# Patient Record
Sex: MALE | Race: BLACK OR AFRICAN AMERICAN | Employment: FULL TIME | URBAN - METROPOLITAN AREA
[De-identification: names, ages, dates, MRNs, and addresses within clinical notes are randomized per-mention and may not be internally consistent; named-entity substitution may affect disease eponyms.]

---

## 2017-01-26 LAB — CRC RECOMMENDATION EXT: NORMAL

## 2020-11-01 ENCOUNTER — APPOINTMENT (OUTPATIENT)
Dept: CT IMAGING | Age: 55
DRG: 420 | End: 2020-11-01
Payer: MEDICAID

## 2020-11-01 ENCOUNTER — APPOINTMENT (OUTPATIENT)
Dept: GENERAL RADIOLOGY | Age: 55
DRG: 420 | End: 2020-11-01
Payer: MEDICAID

## 2020-11-01 ENCOUNTER — HOSPITAL ENCOUNTER (INPATIENT)
Age: 55
LOS: 8 days | Discharge: HOME | DRG: 420 | End: 2020-11-09
Attending: EMERGENCY MEDICINE | Admitting: INTERNAL MEDICINE
Payer: MEDICAID

## 2020-11-01 PROBLEM — E11.10 DKA, TYPE 2, NOT AT GOAL (HCC): Status: ACTIVE | Noted: 2020-11-01

## 2020-11-01 LAB
ALBUMIN SERPL-MCNC: 4.6 G/DL (ref 3.5–5.2)
ALP BLD-CCNC: 109 U/L (ref 40–129)
ALT SERPL-CCNC: 61 U/L (ref 0–40)
ANION GAP SERPL CALCULATED.3IONS-SCNC: 27 MMOL/L (ref 7–16)
ANION GAP SERPL CALCULATED.3IONS-SCNC: 33 MMOL/L (ref 7–16)
AST SERPL-CCNC: 20 U/L (ref 0–39)
B.E.: -8 MMOL/L (ref -3–3)
BACTERIA: ABNORMAL /HPF
BASOPHILS ABSOLUTE: 0.01 E9/L (ref 0–0.2)
BASOPHILS RELATIVE PERCENT: 0.1 % (ref 0–2)
BETA-HYDROXYBUTYRATE: >4.5 MMOL/L (ref 0.02–0.27)
BILIRUB SERPL-MCNC: 1.1 MG/DL (ref 0–1.2)
BILIRUBIN URINE: NEGATIVE
BLOOD, URINE: ABNORMAL
BUN BLDV-MCNC: 46 MG/DL (ref 6–20)
BUN BLDV-MCNC: 51 MG/DL (ref 6–20)
CALCIUM SERPL-MCNC: 10.6 MG/DL (ref 8.6–10.2)
CALCIUM SERPL-MCNC: 9.9 MG/DL (ref 8.6–10.2)
CHLORIDE BLD-SCNC: 109 MMOL/L (ref 98–107)
CHLORIDE BLD-SCNC: 121 MMOL/L (ref 98–107)
CHLORIDE URINE RANDOM: 23 MMOL/L
CHP ED QC CHECK: YES
CLARITY: CLEAR
CO2: 13 MMOL/L (ref 22–29)
CO2: 15 MMOL/L (ref 22–29)
COHB: 0.4 % (ref 0–1.5)
COLOR: YELLOW
CREAT SERPL-MCNC: 1.6 MG/DL (ref 0.7–1.2)
CREAT SERPL-MCNC: 1.8 MG/DL (ref 0.7–1.2)
CREATININE URINE: 204 MG/DL (ref 40–278)
CRITICAL: ABNORMAL
DATE ANALYZED: ABNORMAL
DATE OF COLLECTION: ABNORMAL
EOSINOPHILS ABSOLUTE: 0 E9/L (ref 0.05–0.5)
EOSINOPHILS RELATIVE PERCENT: 0 % (ref 0–6)
GFR AFRICAN AMERICAN: 29
GFR AFRICAN AMERICAN: 29
GFR AFRICAN AMERICAN: 36
GFR AFRICAN AMERICAN: 38
GFR AFRICAN AMERICAN: 45
GFR AFRICAN AMERICAN: 47
GFR AFRICAN AMERICAN: 54
GFR NON-AFRICAN AMERICAN: 24 ML/MIN/1.73
GFR NON-AFRICAN AMERICAN: 24 ML/MIN/1.73
GFR NON-AFRICAN AMERICAN: 30 ML/MIN/1.73
GFR NON-AFRICAN AMERICAN: 31 ML/MIN/1.73
GFR NON-AFRICAN AMERICAN: 37 ML/MIN/1.73
GFR NON-AFRICAN AMERICAN: 47 ML/MIN/1.73
GFR NON-AFRICAN AMERICAN: 54 ML/MIN/1.73
GLUCOSE BLD-MCNC: 519 MG/DL (ref 74–99)
GLUCOSE BLD-MCNC: 533 MG/DL (ref 74–99)
GLUCOSE BLD-MCNC: 592 MG/DL (ref 74–99)
GLUCOSE BLD-MCNC: 659 MG/DL (ref 74–99)
GLUCOSE BLD-MCNC: 766 MG/DL (ref 74–99)
GLUCOSE BLD-MCNC: >700 MG/DL (ref 74–99)
GLUCOSE BLD-MCNC: >700 MG/DL (ref 74–99)
GLUCOSE URINE: >=1000 MG/DL
HBA1C MFR BLD: 11.2 % (ref 4–5.6)
HCO3: 17.6 MMOL/L (ref 22–26)
HCT VFR BLD CALC: 53.8 % (ref 37–54)
HEMOGLOBIN: 16.9 G/DL (ref 12.5–16.5)
HHB: 5.7 % (ref 0–5)
IMMATURE GRANULOCYTES #: 0.03 E9/L
IMMATURE GRANULOCYTES %: 0.3 % (ref 0–5)
KETONES, URINE: >=80 MG/DL
LAB: ABNORMAL
LEUKOCYTE ESTERASE, URINE: NEGATIVE
LYMPHOCYTES ABSOLUTE: 1.09 E9/L (ref 1.5–4)
LYMPHOCYTES RELATIVE PERCENT: 10.8 % (ref 20–42)
Lab: ABNORMAL
MAGNESIUM: 3.7 MG/DL (ref 1.6–2.6)
MAGNESIUM: 3.7 MG/DL (ref 1.6–2.6)
MCH RBC QN AUTO: 28.6 PG (ref 26–35)
MCHC RBC AUTO-ENTMCNC: 31.4 % (ref 32–34.5)
MCV RBC AUTO: 91.2 FL (ref 80–99.9)
METER GLUCOSE: 307 MG/DL (ref 74–99)
METER GLUCOSE: 321 MG/DL (ref 74–99)
METER GLUCOSE: 344 MG/DL (ref 74–99)
METER GLUCOSE: 422 MG/DL (ref 74–99)
METER GLUCOSE: 423 MG/DL (ref 74–99)
METER GLUCOSE: 482 MG/DL (ref 74–99)
METER GLUCOSE: >500 MG/DL (ref 74–99)
METHB: 0.3 % (ref 0–1.5)
MODE: ABNORMAL
MONOCYTES ABSOLUTE: 0.65 E9/L (ref 0.1–0.95)
MONOCYTES RELATIVE PERCENT: 6.4 % (ref 2–12)
NEUTROPHILS ABSOLUTE: 8.31 E9/L (ref 1.8–7.3)
NEUTROPHILS RELATIVE PERCENT: 82.4 % (ref 43–80)
NITRITE, URINE: NEGATIVE
O2 CONTENT: 23 ML/DL
O2 SATURATION: 94.3 % (ref 92–98.5)
O2HB: 93.6 % (ref 94–97)
OPERATOR ID: 1023
PATIENT TEMP: 37 C
PCO2: 36.7 MMHG (ref 35–45)
PDW BLD-RTO: 13.7 FL (ref 11.5–15)
PERFORMED ON: ABNORMAL
PH BLOOD GAS: 7.3 (ref 7.35–7.45)
PH UA: 5.5 (ref 5–9)
PHOSPHORUS: 1.7 MG/DL (ref 2.5–4.5)
PLATELET # BLD: 267 E9/L (ref 130–450)
PMV BLD AUTO: 11.1 FL (ref 7–12)
PO2: 78 MMHG (ref 75–100)
POC CHLORIDE: 125 MMOL/L (ref 100–108)
POC CHLORIDE: 126 MMOL/L (ref 100–108)
POC CHLORIDE: 128 MMOL/L (ref 100–108)
POC CHLORIDE: 136 MMOL/L (ref 100–108)
POC CHLORIDE: 136 MMOL/L (ref 100–108)
POC CREATININE: 1.9 MG/DL (ref 0.7–1.2)
POC CREATININE: 2.2 MG/DL (ref 0.7–1.2)
POC CREATININE: 2.3 MG/DL (ref 0.7–1.2)
POC CREATININE: 2.8 MG/DL (ref 0.7–1.2)
POC CREATININE: 2.8 MG/DL (ref 0.7–1.2)
POC POTASSIUM: 4.6 MMOL/L (ref 3.5–5)
POC POTASSIUM: >9 MMOL/L (ref 3.5–5)
POC SODIUM: 132 MMOL/L (ref 132–146)
POC SODIUM: 133 MMOL/L (ref 132–146)
POC SODIUM: 145 MMOL/L (ref 132–146)
POC SODIUM: 145 MMOL/L (ref 132–146)
POC SODIUM: 161 MMOL/L (ref 132–146)
POTASSIUM SERPL-SCNC: 4.8 MMOL/L (ref 3.5–5)
POTASSIUM SERPL-SCNC: 5.3 MMOL/L (ref 3.5–5)
POTASSIUM, UR: 65.2 MMOL/L
PROCALCITONIN: 0.27 NG/ML (ref 0–0.08)
PROTEIN UA: NEGATIVE MG/DL
RBC # BLD: 5.9 E12/L (ref 3.8–5.8)
RBC UA: ABNORMAL /HPF (ref 0–2)
REASON FOR REJECTION: NORMAL
REJECTED TEST: NORMAL
SARS-COV-2, NAAT: NOT DETECTED
SODIUM BLD-SCNC: 157 MMOL/L (ref 132–146)
SODIUM BLD-SCNC: 161 MMOL/L (ref 132–146)
SODIUM URINE: <20 MMOL/L
SOURCE, BLOOD GAS: ABNORMAL
SPECIFIC GRAVITY UA: 1.02 (ref 1–1.03)
THB: 17.5 G/DL (ref 11.5–16.5)
TIME ANALYZED: 1247
TOTAL PROTEIN: 8.8 G/DL (ref 6.4–8.3)
TROPONIN: <0.01 NG/ML (ref 0–0.03)
TROPONIN: <0.01 NG/ML (ref 0–0.03)
UREA NITROGEN, UR: 1363 MG/DL (ref 800–1666)
UROBILINOGEN, URINE: 0.2 E.U./DL
WBC # BLD: 10.1 E9/L (ref 4.5–11.5)
WBC UA: ABNORMAL /HPF (ref 0–5)

## 2020-11-01 PROCEDURE — 82570 ASSAY OF URINE CREATININE: CPT

## 2020-11-01 PROCEDURE — 82805 BLOOD GASES W/O2 SATURATION: CPT

## 2020-11-01 PROCEDURE — 84295 ASSAY OF SERUM SODIUM: CPT

## 2020-11-01 PROCEDURE — 83735 ASSAY OF MAGNESIUM: CPT

## 2020-11-01 PROCEDURE — 70450 CT HEAD/BRAIN W/O DYE: CPT

## 2020-11-01 PROCEDURE — 93005 ELECTROCARDIOGRAM TRACING: CPT | Performed by: NURSE PRACTITIONER

## 2020-11-01 PROCEDURE — 82565 ASSAY OF CREATININE: CPT

## 2020-11-01 PROCEDURE — 84484 ASSAY OF TROPONIN QUANT: CPT

## 2020-11-01 PROCEDURE — 82962 GLUCOSE BLOOD TEST: CPT

## 2020-11-01 PROCEDURE — 2580000003 HC RX 258: Performed by: NURSE PRACTITIONER

## 2020-11-01 PROCEDURE — 80048 BASIC METABOLIC PNL TOTAL CA: CPT

## 2020-11-01 PROCEDURE — 99223 1ST HOSP IP/OBS HIGH 75: CPT | Performed by: INTERNAL MEDICINE

## 2020-11-01 PROCEDURE — 87450 HC DIRECT STREP B ANTIGEN: CPT

## 2020-11-01 PROCEDURE — 2580000003 HC RX 258: Performed by: EMERGENCY MEDICINE

## 2020-11-01 PROCEDURE — 87081 CULTURE SCREEN ONLY: CPT

## 2020-11-01 PROCEDURE — 84145 PROCALCITONIN (PCT): CPT

## 2020-11-01 PROCEDURE — 84100 ASSAY OF PHOSPHORUS: CPT

## 2020-11-01 PROCEDURE — 2580000003 HC RX 258: Performed by: INTERNAL MEDICINE

## 2020-11-01 PROCEDURE — 6370000000 HC RX 637 (ALT 250 FOR IP): Performed by: EMERGENCY MEDICINE

## 2020-11-01 PROCEDURE — 84300 ASSAY OF URINE SODIUM: CPT

## 2020-11-01 PROCEDURE — 83935 ASSAY OF URINE OSMOLALITY: CPT

## 2020-11-01 PROCEDURE — 99285 EMERGENCY DEPT VISIT HI MDM: CPT

## 2020-11-01 PROCEDURE — 87088 URINE BACTERIA CULTURE: CPT

## 2020-11-01 PROCEDURE — 84133 ASSAY OF URINE POTASSIUM: CPT

## 2020-11-01 PROCEDURE — 6360000002 HC RX W HCPCS: Performed by: EMERGENCY MEDICINE

## 2020-11-01 PROCEDURE — 82947 ASSAY GLUCOSE BLOOD QUANT: CPT

## 2020-11-01 PROCEDURE — 71045 X-RAY EXAM CHEST 1 VIEW: CPT

## 2020-11-01 PROCEDURE — 82010 KETONE BODYS QUAN: CPT

## 2020-11-01 PROCEDURE — 84132 ASSAY OF SERUM POTASSIUM: CPT

## 2020-11-01 PROCEDURE — 2500000003 HC RX 250 WO HCPCS: Performed by: INTERNAL MEDICINE

## 2020-11-01 PROCEDURE — 87449 NOS EACH ORGANISM AG IA: CPT

## 2020-11-01 PROCEDURE — 96374 THER/PROPH/DIAG INJ IV PUSH: CPT

## 2020-11-01 PROCEDURE — 85025 COMPLETE CBC W/AUTO DIFF WBC: CPT

## 2020-11-01 PROCEDURE — 6360000002 HC RX W HCPCS: Performed by: INTERNAL MEDICINE

## 2020-11-01 PROCEDURE — 80053 COMPREHEN METABOLIC PANEL: CPT

## 2020-11-01 PROCEDURE — 82436 ASSAY OF URINE CHLORIDE: CPT

## 2020-11-01 PROCEDURE — 82435 ASSAY OF BLOOD CHLORIDE: CPT

## 2020-11-01 PROCEDURE — 81001 URINALYSIS AUTO W/SCOPE: CPT

## 2020-11-01 PROCEDURE — 2000000000 HC ICU R&B

## 2020-11-01 PROCEDURE — 86140 C-REACTIVE PROTEIN: CPT

## 2020-11-01 PROCEDURE — U0002 COVID-19 LAB TEST NON-CDC: HCPCS

## 2020-11-01 PROCEDURE — 83036 HEMOGLOBIN GLYCOSYLATED A1C: CPT

## 2020-11-01 PROCEDURE — 93005 ELECTROCARDIOGRAM TRACING: CPT | Performed by: INTERNAL MEDICINE

## 2020-11-01 PROCEDURE — 84540 ASSAY OF URINE/UREA-N: CPT

## 2020-11-01 RX ORDER — POTASSIUM CHLORIDE 7.45 MG/ML
10 INJECTION INTRAVENOUS PRN
Status: DISCONTINUED | OUTPATIENT
Start: 2020-11-01 | End: 2020-11-02

## 2020-11-01 RX ORDER — ONDANSETRON 2 MG/ML
4 INJECTION INTRAMUSCULAR; INTRAVENOUS EVERY 6 HOURS PRN
Status: DISCONTINUED | OUTPATIENT
Start: 2020-11-01 | End: 2020-11-09 | Stop reason: HOSPADM

## 2020-11-01 RX ORDER — SODIUM CHLORIDE 0.9 % (FLUSH) 0.9 %
10 SYRINGE (ML) INJECTION PRN
Status: DISCONTINUED | OUTPATIENT
Start: 2020-11-01 | End: 2020-11-09 | Stop reason: HOSPADM

## 2020-11-01 RX ORDER — ACETAMINOPHEN 325 MG/1
650 TABLET ORAL EVERY 6 HOURS PRN
Status: DISCONTINUED | OUTPATIENT
Start: 2020-11-01 | End: 2020-11-09 | Stop reason: HOSPADM

## 2020-11-01 RX ORDER — MAGNESIUM SULFATE 1 G/100ML
1 INJECTION INTRAVENOUS PRN
Status: DISCONTINUED | OUTPATIENT
Start: 2020-11-01 | End: 2020-11-02

## 2020-11-01 RX ORDER — 0.9 % SODIUM CHLORIDE 0.9 %
1000 INTRAVENOUS SOLUTION INTRAVENOUS ONCE
Status: COMPLETED | OUTPATIENT
Start: 2020-11-01 | End: 2020-11-01

## 2020-11-01 RX ORDER — SODIUM CHLORIDE 450 MG/100ML
INJECTION, SOLUTION INTRAVENOUS CONTINUOUS
Status: DISCONTINUED | OUTPATIENT
Start: 2020-11-01 | End: 2020-11-01

## 2020-11-01 RX ORDER — PROMETHAZINE HYDROCHLORIDE 25 MG/1
12.5 TABLET ORAL EVERY 6 HOURS PRN
Status: DISCONTINUED | OUTPATIENT
Start: 2020-11-01 | End: 2020-11-09 | Stop reason: HOSPADM

## 2020-11-01 RX ORDER — ASPIRIN 81 MG/1
81 TABLET ORAL DAILY
COMMUNITY

## 2020-11-01 RX ORDER — ACETAMINOPHEN 650 MG/1
650 SUPPOSITORY RECTAL EVERY 6 HOURS PRN
Status: DISCONTINUED | OUTPATIENT
Start: 2020-11-01 | End: 2020-11-09 | Stop reason: HOSPADM

## 2020-11-01 RX ORDER — DEXTROSE, SODIUM CHLORIDE, AND POTASSIUM CHLORIDE 5; .45; .15 G/100ML; G/100ML; G/100ML
INJECTION INTRAVENOUS CONTINUOUS PRN
Status: DISCONTINUED | OUTPATIENT
Start: 2020-11-01 | End: 2020-11-02

## 2020-11-01 RX ORDER — SODIUM CHLORIDE 450 MG/100ML
INJECTION, SOLUTION INTRAVENOUS CONTINUOUS
Status: DISCONTINUED | OUTPATIENT
Start: 2020-11-01 | End: 2020-11-02

## 2020-11-01 RX ORDER — DEXTROSE MONOHYDRATE 25 G/50ML
12.5 INJECTION, SOLUTION INTRAVENOUS PRN
Status: DISCONTINUED | OUTPATIENT
Start: 2020-11-01 | End: 2020-11-09 | Stop reason: HOSPADM

## 2020-11-01 RX ORDER — 0.9 % SODIUM CHLORIDE 0.9 %
2000 INTRAVENOUS SOLUTION INTRAVENOUS ONCE
Status: COMPLETED | OUTPATIENT
Start: 2020-11-01 | End: 2020-11-01

## 2020-11-01 RX ORDER — SODIUM CHLORIDE 0.9 % (FLUSH) 0.9 %
10 SYRINGE (ML) INJECTION EVERY 12 HOURS SCHEDULED
Status: DISCONTINUED | OUTPATIENT
Start: 2020-11-01 | End: 2020-11-09 | Stop reason: HOSPADM

## 2020-11-01 RX ORDER — HEPARIN SODIUM 10000 [USP'U]/ML
5000 INJECTION, SOLUTION INTRAVENOUS; SUBCUTANEOUS EVERY 8 HOURS SCHEDULED
Status: DISCONTINUED | OUTPATIENT
Start: 2020-11-02 | End: 2020-11-02

## 2020-11-01 RX ADMIN — SODIUM CHLORIDE 1000 ML: 9 INJECTION, SOLUTION INTRAVENOUS at 13:30

## 2020-11-01 RX ADMIN — SODIUM PHOSPHATE, MONOBASIC, MONOHYDRATE AND SODIUM PHOSPHATE, DIBASIC, ANHYDROUS 15 MMOL: 276; 142 INJECTION, SOLUTION INTRAVENOUS at 23:07

## 2020-11-01 RX ADMIN — SODIUM CHLORIDE 1000 ML: 9 INJECTION, SOLUTION INTRAVENOUS at 13:04

## 2020-11-01 RX ADMIN — SODIUM CHLORIDE 0.1 UNITS/KG/HR: 9 INJECTION, SOLUTION INTRAVENOUS at 15:59

## 2020-11-01 RX ADMIN — SODIUM CHLORIDE: 4.5 INJECTION, SOLUTION INTRAVENOUS at 19:42

## 2020-11-01 RX ADMIN — POTASSIUM CHLORIDE: 2 INJECTION, SOLUTION, CONCENTRATE INTRAVENOUS at 15:27

## 2020-11-01 RX ADMIN — ENOXAPARIN SODIUM 40 MG: 40 INJECTION SUBCUTANEOUS at 22:17

## 2020-11-01 ASSESSMENT — PAIN SCALES - GENERAL: PAINLEVEL_OUTOF10: 0

## 2020-11-01 NOTE — ED NOTES
POC ran for glucose. K+ hemolyzed, provider notified of hemolysis.       Susan Bean RN  11/01/20 1642

## 2020-11-01 NOTE — ED PROVIDER NOTES
ED Attending  CC: Linda         Department of Emergency Medicine   ED  Provider Note  Admit Date/RoomTime: 11/1/2020 11:23 AM  ED Room: 09/09  MRN: [id number]  Chief Complaint: Fatigue (with sore throat and dizziness x2 days) and Shortness of Breath (starting today)       History of Present Illness   Source of history provided by:  patient. History/Exam Limitations: none. Cait Barajas is a 53 y.o. male who presents to the ED by ambulance and is alone for fatigue and shortness of breath, beginning over a month ago and are unchanged since that time. He states this is associated with blurred vision, feeling dehydrated, increased thirst, frequent urination and denies any associated traumatic incident, frequent headaches, syncope, aphasia, chest pain, fever, cough, nausea, vomiting, abdominal pain, diarrhea, dysuria, hematuria, calf pain or leg swelling. He does admit to seeing a family physician about 2 years ago in [facility] and denies any known medical history. He did have a stress test about 2 years ago and reports this to be negative and denies ever having a heart catheterization. There is a family history of diabetes in his mother and is unsure if it is type I or type II. Otherwise he takes no daily medications. EMS  mg/dL. The complaint has been persistent, moderate in severity. ROS    Pertinent positives and negatives are stated within HPI, all other systems reviewed and are negative. History reviewed. No pertinent past medical history. History reviewed. No pertinent surgical history. Social History:  reports that he has never smoked. He has never used smokeless tobacco. He reports current alcohol use. He reports that he does not use drugs. Family History: family history is not on file. Allergies: Patient has no known allergies.     Physical Exam   Oxygen Saturation Interpretation: Normal.   ED Triage Vitals [11/01/20 1127]   BP Temp Temp Source Pulse Resp SpO2 Height Weight   (!) 177/114 96.2 (L) 20.0 - 42.0 %    Monocytes % 6.4 2.0 - 12.0 %    Eosinophils % 0.0 0.0 - 6.0 %    Basophils % 0.1 0.0 - 2.0 %    Neutrophils Absolute 8.31 (H) 1.80 - 7.30 E9/L    Immature Granulocytes # 0.03 E9/L    Lymphocytes Absolute 1.09 (L) 1.50 - 4.00 E9/L    Monocytes Absolute 0.65 0.10 - 0.95 E9/L    Eosinophils Absolute 0.00 (L) 0.05 - 0.50 E9/L    Basophils Absolute 0.01 0.00 - 0.20 E9/L   Troponin   Result Value Ref Range    Troponin <0.01 0.00 - 0.03 ng/mL   HEMOGLOBIN A1C   Result Value Ref Range    Hemoglobin A1C 11.2 (H) 4.0 - 5.6 %   Urinalysis   Result Value Ref Range    Color, UA Yellow Straw/Yellow    Clarity, UA Clear Clear    Glucose, Ur >=1000 (A) Negative mg/dL    Bilirubin Urine Negative Negative    Ketones, Urine >=80 (A) Negative mg/dL    Specific Gravity, UA 1.025 1.005 - 1.030    Blood, Urine SMALL (A) Negative    pH, UA 5.5 5.0 - 9.0    Protein, UA Negative Negative mg/dL    Urobilinogen, Urine 0.2 <2.0 E.U./dL    Nitrite, Urine Negative Negative    Leukocyte Esterase, Urine Negative Negative   Magnesium   Result Value Ref Range    Magnesium 3.7 (H) 1.6 - 2.6 mg/dL   Beta-Hydroxybutyrate   Result Value Ref Range    Beta-Hydroxybutyrate >4.50 (H) 0.02 - 0.27 mmol/L   COMPREHENSIVE METABOLIC PANEL   Result Value Ref Range    Sodium 157 (H) 132 - 146 mmol/L    Potassium 4.8 3.5 - 5.0 mmol/L    Chloride 109 (H) 98 - 107 mmol/L    CO2 15 (L) 22 - 29 mmol/L    Anion Gap 33 (H) 7 - 16 mmol/L    Glucose 766 (HH) 74 - 99 mg/dL    BUN 51 (H) 6 - 20 mg/dL    CREATININE 1.8 (H) 0.7 - 1.2 mg/dL    GFR Non-African American 47 >=60 mL/min/1.73    GFR African American 47     Calcium 10.6 (H) 8.6 - 10.2 mg/dL    Total Protein 8.8 (H) 6.4 - 8.3 g/dL    Alb 4.6 3.5 - 5.2 g/dL    Total Bilirubin 1.1 0.0 - 1.2 mg/dL    Alkaline Phosphatase 109 40 - 129 U/L    ALT 61 (H) 0 - 40 U/L    AST 20 0 - 39 U/L   Blood Gas, Arterial   Result Value Ref Range    Date Analyzed [id number]     Time Analyzed 1247     Source: Blood CONTRAST   Final Result   No acute intracranial abnormality. XR CHEST PORTABLE   Final Result   1. Slightly limited exam, grossly negative for acute process. .             ED Course / Medical Decision Making     Medications   potassium chloride 40 mEq in sodium chloride 0.45 % 1,000 mL infusion ( Intravenous New Bag 11/1/20 1527)   dextrose 50 % IV solution (has no administration in time range)   potassium chloride 10 mEq/100 mL IVPB (Peripheral Line) (has no administration in time range)   magnesium sulfate 1 g in dextrose 5% 100 mL IVPB (has no administration in time range)   sodium phosphate 10 mmol in dextrose 5 % 250 mL IVPB (has no administration in time range)     Or   sodium phosphate 15 mmol in dextrose 5 % 250 mL IVPB (has no administration in time range)     Or   sodium phosphate 20 mmol in dextrose 5 % 500 mL IVPB (has no administration in time range)   dextrose 5 % and 0.45 % NaCl with KCl 20 mEq infusion (has no administration in time range)   insulin regular (HUMULIN R;NOVOLIN R) 100 Units in sodium chloride 0.9 % 100 mL infusion (0.1 Units/kg/hr × 132.9 kg Intravenous New Bag 11/1/20 1559)   0.9 % sodium chloride bolus (0 mLs Intravenous Stopped 11/1/20 1530)   0.9 % sodium chloride bolus (0 mLs Intravenous Stopped 11/1/20 1430)     ED Course as of Nov 01 1632   Sun Nov 01, 2020   1420 Patient updated regarding the results of his evaluation. He will be admitted to the hospital, will go to the ICU, will consult nephrology. [SO]   [id number] Spoke with Dr. Arcola Galeazzi, nephrology. He recommends hydration with half-normal saline considering the hypernatremia and dehydration in this patient who is in DKA.    [SO]   [phone] with Dr. Chaya Villa, intensivist, will accept this patient to the ICU.    [SO]      ED Course User Index  [SO] Dotty Malloy DO    EKG #1:  Interpreted by emergency department physician unless otherwise noted. Time:  1134    Rate: 106  Rhythm: Sinus.   Interpretation: sinus tachycardia, T wave inversion lateral leads, no previous for comparison. Re-examination:  11/1/20       Time: 1350   Patient off the unit in radiology. Time: 1440   Patient moved to Main ED and updated on results and admission to the hospital. No change in exam.    Consult(s):   IP CONSULT TO NEPHROLOGY  IP CONSULT TO INTERNAL MEDICINE  IP CONSULT TO CRITICAL CARE   Time: 12 Dr. Ehsan Christiansen returned call and updated by Dr. Juana Machuca. Time: 9784 Dr. Linda Londono returned call and accepts the patient. Time: [address] resident aware of consult by me. Time: 5 Dr. Chavez Gonzalez returned call and accepts the patient to ICU. Procedure(s):   none    MDM:   Patient evaluated by Dr. Juana Machuca. He has no history of diabetes however high suspicion for DKA given his clinical exam and glucometer reading of \"hi\" despite EMS reports of  mg/dL. Labs and diagnostics as resulted above including BGL 766mg/dL and POC chemistry hemolyzed. No peaked T waves on EKG. CMP potassium 4.8. Chest x-ray and CT is interpreted by radiologist negative for acute pathology. Given his hypernatremia with hyperglycemia and DKA, nephrology was consulted and recommended changing his IV fluids to half-normal saline. Otherwise fluid and electrolyte replacement with insulin per protocol as ordered by Dr. Juana Machuca. Admission to the ICU as arranged with the hospitalist and intensivist.    Counseling:  Emergency Attending Physician and I reviewed today's visit with the patient in addition to providing specific details for the plan of care and counseling regarding the diagnosis and prognosis. Questions are answered at this time and are agreeable with the plan. Assessment      1.  Diabetic ketoacidosis without coma associated with other specified diabetes mellitus ([facility] [address]      Plan   Admit to CCU/ICU  Patient condition is stable    New Medications     New Prescriptions    No medications on file     Electronically signed by ARAM Billy CNP DD: 11/1/20  **This report was transcribed using voice recognition software. Every effort was made to ensure accuracy; however, inadvertent computerized transcription errors may be present.   END OF ED PROVIDER NOTE       Luli Bernabe, ARAM - CNP  11/01/20 [id number] [facility], ARAM - CNP  11/01/20 8229

## 2020-11-02 LAB
ALBUMIN SERPL-MCNC: 4.1 G/DL (ref 3.5–5.2)
ALP BLD-CCNC: 89 U/L (ref 40–129)
ALT SERPL-CCNC: 51 U/L (ref 0–40)
ANION GAP SERPL CALCULATED.3IONS-SCNC: 12 MMOL/L (ref 7–16)
ANION GAP SERPL CALCULATED.3IONS-SCNC: 13 MMOL/L (ref 7–16)
ANION GAP SERPL CALCULATED.3IONS-SCNC: 14 MMOL/L (ref 7–16)
ANION GAP SERPL CALCULATED.3IONS-SCNC: 15 MMOL/L (ref 7–16)
ANION GAP SERPL CALCULATED.3IONS-SCNC: 18 MMOL/L (ref 7–16)
ANION GAP SERPL CALCULATED.3IONS-SCNC: 20 MMOL/L (ref 7–16)
APTT: 23.4 SEC (ref 24.5–35.1)
AST SERPL-CCNC: 31 U/L (ref 0–39)
BILIRUB SERPL-MCNC: 0.3 MG/DL (ref 0–1.2)
BILIRUBIN DIRECT: <0.2 MG/DL (ref 0–0.3)
BILIRUBIN, INDIRECT: ABNORMAL MG/DL (ref 0–1)
BUN BLDV-MCNC: 46 MG/DL (ref 6–20)
BUN BLDV-MCNC: 47 MG/DL (ref 6–20)
BUN BLDV-MCNC: 47 MG/DL (ref 6–20)
BUN BLDV-MCNC: 48 MG/DL (ref 6–20)
BUN BLDV-MCNC: 49 MG/DL (ref 6–20)
BUN BLDV-MCNC: 49 MG/DL (ref 6–20)
C-REACTIVE PROTEIN: 2.8 MG/DL (ref 0–0.4)
CALCIUM SERPL-MCNC: 10.1 MG/DL (ref 8.6–10.2)
CALCIUM SERPL-MCNC: 8.9 MG/DL (ref 8.6–10.2)
CALCIUM SERPL-MCNC: 9.4 MG/DL (ref 8.6–10.2)
CALCIUM SERPL-MCNC: 9.7 MG/DL (ref 8.6–10.2)
CALCIUM SERPL-MCNC: 9.7 MG/DL (ref 8.6–10.2)
CALCIUM SERPL-MCNC: 9.8 MG/DL (ref 8.6–10.2)
CHLORIDE BLD-SCNC: 116 MMOL/L (ref 98–107)
CHLORIDE BLD-SCNC: 118 MMOL/L (ref 98–107)
CHLORIDE BLD-SCNC: 121 MMOL/L (ref 98–107)
CHLORIDE BLD-SCNC: 126 MMOL/L (ref 98–107)
CHLORIDE BLD-SCNC: 128 MMOL/L (ref 98–107)
CHLORIDE BLD-SCNC: 129 MMOL/L (ref 98–107)
CO2: 20 MMOL/L (ref 22–29)
CO2: 21 MMOL/L (ref 22–29)
CO2: 23 MMOL/L (ref 22–29)
CO2: 26 MMOL/L (ref 22–29)
CO2: 27 MMOL/L (ref 22–29)
CO2: 27 MMOL/L (ref 22–29)
CREAT SERPL-MCNC: 1.6 MG/DL (ref 0.7–1.2)
CREAT SERPL-MCNC: 1.8 MG/DL (ref 0.7–1.2)
CREAT SERPL-MCNC: 1.9 MG/DL (ref 0.7–1.2)
CREATININE URINE: 184 MG/DL (ref 40–278)
EKG ATRIAL RATE: 101 BPM
EKG ATRIAL RATE: 106 BPM
EKG P AXIS: 31 DEGREES
EKG P AXIS: 43 DEGREES
EKG P-R INTERVAL: 144 MS
EKG P-R INTERVAL: 144 MS
EKG Q-T INTERVAL: 372 MS
EKG Q-T INTERVAL: 394 MS
EKG QRS DURATION: 102 MS
EKG QRS DURATION: 98 MS
EKG QTC CALCULATION (BAZETT): 494 MS
EKG QTC CALCULATION (BAZETT): 510 MS
EKG R AXIS: -3 DEGREES
EKG R AXIS: 27 DEGREES
EKG T AXIS: -10 DEGREES
EKG T AXIS: -9 DEGREES
EKG VENTRICULAR RATE: 101 BPM
EKG VENTRICULAR RATE: 106 BPM
GFR AFRICAN AMERICAN: 45
GFR AFRICAN AMERICAN: 47
GFR AFRICAN AMERICAN: 54
GFR NON-AFRICAN AMERICAN: 45 ML/MIN/1.73
GFR NON-AFRICAN AMERICAN: 47 ML/MIN/1.73
GFR NON-AFRICAN AMERICAN: 54 ML/MIN/1.73
GLUCOSE BLD-MCNC: 179 MG/DL (ref 74–99)
GLUCOSE BLD-MCNC: 233 MG/DL (ref 74–99)
GLUCOSE BLD-MCNC: 237 MG/DL (ref 74–99)
GLUCOSE BLD-MCNC: 283 MG/DL (ref 74–99)
GLUCOSE BLD-MCNC: 419 MG/DL (ref 74–99)
GLUCOSE BLD-MCNC: 489 MG/DL (ref 74–99)
GLUCOSE BLD-MCNC: 499 MG/DL (ref 74–99)
INR BLD: 1
L. PNEUMOPHILA SEROGP 1 UR AG: NORMAL
MAGNESIUM: 2.8 MG/DL (ref 1.6–2.6)
MAGNESIUM: 3 MG/DL (ref 1.6–2.6)
MAGNESIUM: 3.2 MG/DL (ref 1.6–2.6)
MAGNESIUM: 3.3 MG/DL (ref 1.6–2.6)
MAGNESIUM: 3.3 MG/DL (ref 1.6–2.6)
MAGNESIUM: 3.4 MG/DL (ref 1.6–2.6)
METER GLUCOSE: 148 MG/DL (ref 74–99)
METER GLUCOSE: 155 MG/DL (ref 74–99)
METER GLUCOSE: 185 MG/DL (ref 74–99)
METER GLUCOSE: 190 MG/DL (ref 74–99)
METER GLUCOSE: 197 MG/DL (ref 74–99)
METER GLUCOSE: 203 MG/DL (ref 74–99)
METER GLUCOSE: 204 MG/DL (ref 74–99)
METER GLUCOSE: 205 MG/DL (ref 74–99)
METER GLUCOSE: 208 MG/DL (ref 74–99)
METER GLUCOSE: 216 MG/DL (ref 74–99)
METER GLUCOSE: 232 MG/DL (ref 74–99)
METER GLUCOSE: 234 MG/DL (ref 74–99)
METER GLUCOSE: 234 MG/DL (ref 74–99)
METER GLUCOSE: 247 MG/DL (ref 74–99)
METER GLUCOSE: 261 MG/DL (ref 74–99)
METER GLUCOSE: 291 MG/DL (ref 74–99)
METER GLUCOSE: 314 MG/DL (ref 74–99)
METER GLUCOSE: 316 MG/DL (ref 74–99)
METER GLUCOSE: 352 MG/DL (ref 74–99)
METER GLUCOSE: 462 MG/DL (ref 74–99)
METER GLUCOSE: 464 MG/DL (ref 74–99)
METER GLUCOSE: >500 MG/DL (ref 74–99)
OSMOLALITY URINE: 1108 MOSM/KG (ref 300–900)
PHOSPHORUS: 1.3 MG/DL (ref 2.5–4.5)
PHOSPHORUS: 1.8 MG/DL (ref 2.5–4.5)
PHOSPHORUS: 2 MG/DL (ref 2.5–4.5)
PHOSPHORUS: 3.4 MG/DL (ref 2.5–4.5)
PHOSPHORUS: 3.7 MG/DL (ref 2.5–4.5)
PHOSPHORUS: 4.4 MG/DL (ref 2.5–4.5)
POTASSIUM SERPL-SCNC: 3.8 MMOL/L (ref 3.5–5)
POTASSIUM SERPL-SCNC: 3.9 MMOL/L (ref 3.5–5)
POTASSIUM SERPL-SCNC: 4.1 MMOL/L (ref 3.5–5)
POTASSIUM SERPL-SCNC: 4.4 MMOL/L (ref 3.5–5)
POTASSIUM SERPL-SCNC: 4.8 MMOL/L (ref 3.5–5)
POTASSIUM SERPL-SCNC: 4.8 MMOL/L (ref 3.5–5)
PROTEIN PROTEIN: 42 MG/DL (ref 0–12)
PROTEIN/CREAT RATIO: 0.2
PROTEIN/CREAT RATIO: 0.2 (ref 0–0.2)
PROTHROMBIN TIME: 11.2 SEC (ref 9.3–12.4)
SODIUM BLD-SCNC: 156 MMOL/L (ref 132–146)
SODIUM BLD-SCNC: 157 MMOL/L (ref 132–146)
SODIUM BLD-SCNC: 160 MMOL/L (ref 132–146)
SODIUM BLD-SCNC: 166 MMOL/L (ref 132–146)
SODIUM BLD-SCNC: 166 MMOL/L (ref 132–146)
SODIUM BLD-SCNC: 169 MMOL/L (ref 132–146)
STREP PNEUMONIAE ANTIGEN, URINE: NORMAL
TOTAL PROTEIN: 7.4 G/DL (ref 6.4–8.3)
TROPONIN: <0.01 NG/ML (ref 0–0.03)

## 2020-11-02 PROCEDURE — 2580000003 HC RX 258: Performed by: INTERNAL MEDICINE

## 2020-11-02 PROCEDURE — 6360000002 HC RX W HCPCS: Performed by: INTERNAL MEDICINE

## 2020-11-02 PROCEDURE — 87040 BLOOD CULTURE FOR BACTERIA: CPT

## 2020-11-02 PROCEDURE — 93010 ELECTROCARDIOGRAM REPORT: CPT | Performed by: INTERNAL MEDICINE

## 2020-11-02 PROCEDURE — 99233 SBSQ HOSP IP/OBS HIGH 50: CPT | Performed by: INTERNAL MEDICINE

## 2020-11-02 PROCEDURE — 82570 ASSAY OF URINE CREATININE: CPT

## 2020-11-02 PROCEDURE — 2500000003 HC RX 250 WO HCPCS: Performed by: INTERNAL MEDICINE

## 2020-11-02 PROCEDURE — 84100 ASSAY OF PHOSPHORUS: CPT

## 2020-11-02 PROCEDURE — 83735 ASSAY OF MAGNESIUM: CPT

## 2020-11-02 PROCEDURE — 82962 GLUCOSE BLOOD TEST: CPT

## 2020-11-02 PROCEDURE — 84484 ASSAY OF TROPONIN QUANT: CPT

## 2020-11-02 PROCEDURE — 85730 THROMBOPLASTIN TIME PARTIAL: CPT

## 2020-11-02 PROCEDURE — 85610 PROTHROMBIN TIME: CPT

## 2020-11-02 PROCEDURE — 82947 ASSAY GLUCOSE BLOOD QUANT: CPT

## 2020-11-02 PROCEDURE — 2000000000 HC ICU R&B

## 2020-11-02 PROCEDURE — 80048 BASIC METABOLIC PNL TOTAL CA: CPT

## 2020-11-02 PROCEDURE — 6370000000 HC RX 637 (ALT 250 FOR IP): Performed by: INTERNAL MEDICINE

## 2020-11-02 PROCEDURE — 36556 INSERT NON-TUNNEL CV CATH: CPT

## 2020-11-02 PROCEDURE — 84156 ASSAY OF PROTEIN URINE: CPT

## 2020-11-02 PROCEDURE — 36415 COLL VENOUS BLD VENIPUNCTURE: CPT

## 2020-11-02 PROCEDURE — 80076 HEPATIC FUNCTION PANEL: CPT

## 2020-11-02 PROCEDURE — 36592 COLLECT BLOOD FROM PICC: CPT | Performed by: NURSE PRACTITIONER

## 2020-11-02 PROCEDURE — 06HY33Z INSERTION OF INFUSION DEVICE INTO LOWER VEIN, PERCUTANEOUS APPROACH: ICD-10-PCS | Performed by: INTERNAL MEDICINE

## 2020-11-02 RX ORDER — SODIUM CHLORIDE 9 MG/ML
INJECTION, SOLUTION INTRAVENOUS CONTINUOUS
Status: DISCONTINUED | OUTPATIENT
Start: 2020-11-02 | End: 2020-11-03

## 2020-11-02 RX ORDER — INSULIN GLARGINE 100 [IU]/ML
25 INJECTION, SOLUTION SUBCUTANEOUS DAILY
Status: DISCONTINUED | OUTPATIENT
Start: 2020-11-02 | End: 2020-11-02

## 2020-11-02 RX ORDER — DEXTROSE MONOHYDRATE 25 G/50ML
12.5 INJECTION, SOLUTION INTRAVENOUS PRN
Status: DISCONTINUED | OUTPATIENT
Start: 2020-11-02 | End: 2020-11-09 | Stop reason: HOSPADM

## 2020-11-02 RX ORDER — NICOTINE POLACRILEX 4 MG
15 LOZENGE BUCCAL PRN
Status: DISCONTINUED | OUTPATIENT
Start: 2020-11-02 | End: 2020-11-09 | Stop reason: HOSPADM

## 2020-11-02 RX ORDER — MAGNESIUM SULFATE 1 G/100ML
1 INJECTION INTRAVENOUS PRN
Status: DISCONTINUED | OUTPATIENT
Start: 2020-11-02 | End: 2020-11-03

## 2020-11-02 RX ORDER — DEXTROSE MONOHYDRATE 50 MG/ML
INJECTION, SOLUTION INTRAVENOUS CONTINUOUS
Status: DISCONTINUED | OUTPATIENT
Start: 2020-11-02 | End: 2020-11-02

## 2020-11-02 RX ORDER — ASPIRIN 81 MG/1
81 TABLET, CHEWABLE ORAL DAILY
Status: DISCONTINUED | OUTPATIENT
Start: 2020-11-02 | End: 2020-11-09 | Stop reason: HOSPADM

## 2020-11-02 RX ORDER — HYDRALAZINE HYDROCHLORIDE 20 MG/ML
10 INJECTION INTRAMUSCULAR; INTRAVENOUS EVERY 4 HOURS PRN
Status: DISCONTINUED | OUTPATIENT
Start: 2020-11-02 | End: 2020-11-09 | Stop reason: HOSPADM

## 2020-11-02 RX ORDER — DEXTROSE MONOHYDRATE 50 MG/ML
100 INJECTION, SOLUTION INTRAVENOUS PRN
Status: DISCONTINUED | OUTPATIENT
Start: 2020-11-02 | End: 2020-11-09 | Stop reason: HOSPADM

## 2020-11-02 RX ORDER — DEXTROSE MONOHYDRATE 50 MG/ML
INJECTION, SOLUTION INTRAVENOUS CONTINUOUS
Status: DISCONTINUED | OUTPATIENT
Start: 2020-11-02 | End: 2020-11-04

## 2020-11-02 RX ORDER — POTASSIUM CHLORIDE 7.45 MG/ML
10 INJECTION INTRAVENOUS PRN
Status: DISCONTINUED | OUTPATIENT
Start: 2020-11-02 | End: 2020-11-03

## 2020-11-02 RX ORDER — LABETALOL HYDROCHLORIDE 5 MG/ML
10 INJECTION, SOLUTION INTRAVENOUS EVERY 4 HOURS PRN
Status: DISCONTINUED | OUTPATIENT
Start: 2020-11-02 | End: 2020-11-09 | Stop reason: HOSPADM

## 2020-11-02 RX ORDER — INSULIN GLARGINE 100 [IU]/ML
30 INJECTION, SOLUTION SUBCUTANEOUS DAILY
Status: DISCONTINUED | OUTPATIENT
Start: 2020-11-03 | End: 2020-11-02

## 2020-11-02 RX ORDER — DEXTROSE AND SODIUM CHLORIDE 5; .45 G/100ML; G/100ML
INJECTION, SOLUTION INTRAVENOUS CONTINUOUS PRN
Status: DISCONTINUED | OUTPATIENT
Start: 2020-11-02 | End: 2020-11-03

## 2020-11-02 RX ORDER — DEXTROSE AND SODIUM CHLORIDE 5; .45 G/100ML; G/100ML
INJECTION, SOLUTION INTRAVENOUS CONTINUOUS
Status: DISCONTINUED | OUTPATIENT
Start: 2020-11-02 | End: 2020-11-02

## 2020-11-02 RX ORDER — DEXTROSE MONOHYDRATE 25 G/50ML
12.5 INJECTION, SOLUTION INTRAVENOUS PRN
Status: DISCONTINUED | OUTPATIENT
Start: 2020-11-02 | End: 2020-11-02 | Stop reason: SDUPTHER

## 2020-11-02 RX ADMIN — POTASSIUM CHLORIDE 10 MEQ: 10 INJECTION, SOLUTION INTRAVENOUS at 03:49

## 2020-11-02 RX ADMIN — POTASSIUM PHOSPHATE, MONOBASIC AND POTASSIUM PHOSPHATE, DIBASIC 15 MMOL: 224; 236 INJECTION, SOLUTION, CONCENTRATE INTRAVENOUS at 11:15

## 2020-11-02 RX ADMIN — DEXTROSE MONOHYDRATE: 50 INJECTION, SOLUTION INTRAVENOUS at 08:52

## 2020-11-02 RX ADMIN — DEXTROSE MONOHYDRATE: 50 INJECTION, SOLUTION INTRAVENOUS at 12:35

## 2020-11-02 RX ADMIN — SODIUM CHLORIDE, PRESERVATIVE FREE 10 ML: 5 INJECTION INTRAVENOUS at 06:22

## 2020-11-02 RX ADMIN — HEPARIN SODIUM 5000 UNITS: 10000 INJECTION INTRAVENOUS; SUBCUTANEOUS at 14:30

## 2020-11-02 RX ADMIN — SODIUM CHLORIDE 11.48 UNITS/HR: 9 INJECTION, SOLUTION INTRAVENOUS at 22:20

## 2020-11-02 RX ADMIN — SODIUM CHLORIDE: 4.5 INJECTION, SOLUTION INTRAVENOUS at 00:09

## 2020-11-02 RX ADMIN — INSULIN GLARGINE 25 UNITS: 100 INJECTION, SOLUTION SUBCUTANEOUS at 11:43

## 2020-11-02 RX ADMIN — POTASSIUM CHLORIDE 10 MEQ: 10 INJECTION, SOLUTION INTRAVENOUS at 04:52

## 2020-11-02 RX ADMIN — POTASSIUM CHLORIDE 10 MEQ: 10 INJECTION, SOLUTION INTRAVENOUS at 02:53

## 2020-11-02 RX ADMIN — SODIUM CHLORIDE, PRESERVATIVE FREE 10 ML: 5 INJECTION INTRAVENOUS at 06:01

## 2020-11-02 RX ADMIN — SODIUM CHLORIDE: 9 INJECTION, SOLUTION INTRAVENOUS at 22:20

## 2020-11-02 RX ADMIN — POTASSIUM CHLORIDE 10 MEQ: 10 INJECTION, SOLUTION INTRAVENOUS at 08:30

## 2020-11-02 RX ADMIN — SODIUM CHLORIDE 0.04 UNITS/KG/HR: 9 INJECTION, SOLUTION INTRAVENOUS at 01:24

## 2020-11-02 RX ADMIN — POTASSIUM CHLORIDE 10 MEQ: 10 INJECTION, SOLUTION INTRAVENOUS at 09:36

## 2020-11-02 RX ADMIN — ASPIRIN 81 MG CHEWABLE TABLET 81 MG: 81 TABLET CHEWABLE at 09:52

## 2020-11-02 RX ADMIN — SODIUM CHLORIDE 13.7 UNITS/HR: 9 INJECTION, SOLUTION INTRAVENOUS at 09:47

## 2020-11-02 RX ADMIN — POTASSIUM CHLORIDE, DEXTROSE MONOHYDRATE AND SODIUM CHLORIDE: 150; 5; 450 INJECTION, SOLUTION INTRAVENOUS at 01:25

## 2020-11-02 RX ADMIN — INSULIN LISPRO 2 UNITS: 100 INJECTION, SOLUTION INTRAVENOUS; SUBCUTANEOUS at 12:01

## 2020-11-02 RX ADMIN — Medication 10 ML: at 22:28

## 2020-11-02 RX ADMIN — DEXTROSE MONOHYDRATE: 50 INJECTION, SOLUTION INTRAVENOUS at 07:03

## 2020-11-02 RX ADMIN — INSULIN LISPRO 12 UNITS: 100 INJECTION, SOLUTION INTRAVENOUS; SUBCUTANEOUS at 17:02

## 2020-11-02 RX ADMIN — HEPARIN SODIUM 5000 UNITS: 10000 INJECTION INTRAVENOUS; SUBCUTANEOUS at 06:33

## 2020-11-02 RX ADMIN — DEXTROSE MONOHYDRATE: 50 INJECTION, SOLUTION INTRAVENOUS at 02:30

## 2020-11-02 RX ADMIN — Medication 10 ML: at 09:33

## 2020-11-02 RX ADMIN — SODIUM CHLORIDE, PRESERVATIVE FREE 10 ML: 5 INJECTION INTRAVENOUS at 02:41

## 2020-11-02 RX ADMIN — LABETALOL HYDROCHLORIDE 10 MG: 5 INJECTION, SOLUTION INTRAVENOUS at 12:03

## 2020-11-02 RX ADMIN — LABETALOL HYDROCHLORIDE 10 MG: 5 INJECTION, SOLUTION INTRAVENOUS at 04:00

## 2020-11-02 ASSESSMENT — PAIN SCALES - GENERAL
PAINLEVEL_OUTOF10: 0

## 2020-11-02 NOTE — PROGRESS NOTES
Department of Internal Medicine  Nephrology Progress Note  Events reviewed. SUBJECTIVE: We are following Mr. Curry Michelle for hypernatremia and ANDRAE. He reports no complaints. States that he is thirsty.     PHYSICAL EXAM:      Vitals:    VITALS:  BP (!) 184/117   Pulse 79   Temp 98.6 °F (37 °C) (Tympanic)   Resp 16   Ht 6' (1.829 m)   Wt 253 lb (114.8 kg)   SpO2 94%   BMI 34.31 kg/m²   24HR INTAKE/OUTPUT:      Intake/Output Summary (Last 24 hours) at 11/2/2020 1211  Last data filed at 11/2/2020 1200  Gross per 24 hour   Intake 5448 ml   Output 1335 ml   Net 4113 ml       Constitutional: Patient is awake alert in no distress  HEENT: Pupils are equal reactive, mucous membranes are very dry  Respiratory: Lungs are clear  Cardiovascular/Edema: Heart sounds are regular rate and rhythm  Gastrointestinal: Abdomen is soft nontender  Neurologic: Nonfocal  Skin: No lesions no edema  Other: No edema    Scheduled Meds:   aspirin  81 mg Oral Daily    potassium phosphate IVPB  15 mmol Intravenous Once    insulin glargine  25 Units Subcutaneous Daily    insulin lispro  0-12 Units Subcutaneous TID WC    insulin lispro  0-6 Units Subcutaneous Nightly    sodium chloride flush  10 mL Intravenous 2 times per day    heparin (porcine)  5,000 Units Subcutaneous 3 times per day     Continuous Infusions:   dextrose 200 mL/hr at 11/02/20 0852    dextrose      dextrose 5% and 0.45% NaCl with KCl 20 mEq Stopped (11/02/20 0130)    insulin 7.9 Units/hr (11/02/20 1100)     PRN Meds:.hydrALAZINE, labetalol, glucose, dextrose, glucagon (rDNA), dextrose, dextrose, potassium chloride, magnesium sulfate, sodium phosphate IVPB **OR** sodium phosphate IVPB **OR** sodium phosphate IVPB, dextrose 5% and 0.45% NaCl with KCl 20 mEq, sodium chloride flush, acetaminophen **OR** acetaminophen, magnesium hydroxide, promethazine **OR** ondansetron    DATA:    CBC:   Lab Results   Component Value Date    WBC 10.1 11/01/2020    RBC 5.90 11/01/2020 HGB 16.9 11/01/2020    HCT 53.8 11/01/2020    MCV 91.2 11/01/2020    MCH 28.6 11/01/2020    MCHC 31.4 11/01/2020    RDW 13.7 11/01/2020     11/01/2020    MPV 11.1 11/01/2020     CMP:    Lab Results   Component Value Date     11/02/2020    K 4.1 11/02/2020     11/02/2020    CO2 27 11/02/2020    BUN 48 11/02/2020    CREATININE 1.8 11/02/2020    GFRAA 47 11/02/2020    LABGLOM 47 11/02/2020    GLUCOSE 179 11/02/2020    PROT 7.4 11/02/2020    LABALBU 4.1 11/02/2020    CALCIUM 9.8 11/02/2020    BILITOT 0.3 11/02/2020    ALKPHOS 89 11/02/2020    AST 31 11/02/2020    ALT 51 11/02/2020     Magnesium:    Lab Results   Component Value Date    MG 3.3 11/02/2020     Phosphorus:    Lab Results   Component Value Date    PHOS 2.0 11/02/2020        Radiology Review:      Chest x-ray November 1, 2020   1.  Slightly limited exam, grossly negative for acute process.                   BRIEF SUMMARY OF INITIIAL CONSULT:    Briefly Mr. Rosaura Jovel is a 50-year-old man with unremarkable past medical history, was a  and who presented to the ER reporting feeling quite fatigue and short of breath. After evaluation he was found to have a glucose >700 mg/dL, sodium 157 mEq/L, creatinine 1.8 mg/dL, reasons for this consultation. Patient reports that he has been feeling very thirsty, he is urinating all the time and he has had nocturia for the last 2 weeks. Denies any nausea vomiting or diarrhea. IMPRESSION/RECOMMENDATIONS:      1. ANDRAE stage I, volume responsive prerenal ANDRAE, secondary to urinary losses induced by osmotic diuresis (hyperglycemia). · Renal function remains stable, creatinine 1.8 mg/dL despite IVF administration. Urine output has decreased suggesting that osmotic diuresis is resolved. It is unclear what his baseline is however he remains very dry on exam. He needs continued volume resuscitation.   2. Hypernatremia, severe, corrected sodium for hyperglycemia 168 mEq/L, with profound dehydration secondary to urinary losses (osmotic diuresis). · Sodium remains elevated. IVF changed from 1/2 normal saline to D5W this am. He would also benefit from PO free water intake. 3. Hypophosphatemia  4. New onset DM, presented with DKA; Hgb A1C 11.2  5. DKA, ketones in urine, beta hydroxybutyrate >4.5, bicarbonate levels 15 mEq/L. On insulin gtt.     Plan:    · Continue D5W @ 200 mL/hr  · Encourage PO free water intake  · Replace phosphorous  · Continue to monitor BMP Q 4 hours  · Maintain strict I&Os        Electronically signed by ARAM Nava CNP on 11/2/2020 at 12:12 PM  Agree with above assessment and plan   Discussed with ICU staff    Za Stock MD

## 2020-11-02 NOTE — PATIENT CARE CONFERENCE
[facility] Quality Flow/Interdisciplinary Rounds Progress Note        Quality Flow Rounds held on November 2, 2020    Disciplines Attending:  , pt/ot, resp therapy, bedside nurse, charge nurse, nursing management    Stormy Dancer was admitted on 11/1/2020 11:23 AM    Anticipated Discharge Date:  Expected Discharge Date: 11/08/20    Disposition:    Arnaud Score:  Arnaud Scale Score: 22    Readmission Risk              Risk of Unplanned Readmission:        12           Discussed patient goal for the day, patient clinical progression, and barriers to discharge. The following Goal(s) of the Day/Commitment(s) have been identified:  DKA protocol.       Carli Lester  November 2, 2020

## 2020-11-02 NOTE — CONSULTS
Medical Intensive Care Unit     [facility]  Resident History and Physical    Date and time: 11/1/2020 11:49 PM  Patient's name:  Maya Becerra  Medical Record Number: [id number]  Patient's account/billing number: [de-identified]  Patient's YOB: 1965  Age: 53 y.o. Date of Admission: 11/1/2020 11:23 AM  Length of stay during current admission: 0    Primary Care Physician: No primary care provider on file. ICU Attending Physician: Dr. Matt Maya Status: Full Code    Reason for ICU admission: DKA    History of Present Illness: The patient is a 62-year-old male with any significant past medical history, does not follow with any primary care doctor. The patient presented with complaints of polyuria, polydipsia, polyphagia for the last 3 to 5 days. He denies any fever or chills, no cough or chest pain, no abdominal pain, nausea, vomiting or diarrhea. In the ED patient was found to have glucose level of more than 700, bicarbonate level of 15, anion gap of 33, beta hydroxybutyrate of >4.5 and arterial pH of 7.29. Patient did appear clinically dry and had labs showing hypercalcemia of 10.6, hypermagnesemia 3.7, hemoglobin of 16.9. He also had ALT of 61, other LFT values were normal.  Patient was started on DKA protocol and transferred to MICU for further management. Neurology was also consulted for hypernatremia.      CURRENT VENTILATION STATUS:   [] Ventilator  [] BIPAP  [] Nasal Cannula [x] Room Air      IF INTUBATED, ET TUBE MARKING AT LOWER LIP:       cms    SECRETIONS   Amount:  [] Small [] Moderate  [] Large [x] None    Color:     [] White [] Colored  [] Bloody    SEDATION:  RAAS Score:  [] Propofol gtt  [] Versed gtt  [] Ativan gtt   [x] No Sedation    PARALYZED:  [x] No    [] Yes    VASOPRESSORS:  [x] No    [] Yes    If yes -   [] Levophed       [] Dopamine     [] Vasopressin       [] Dobutamine  [] Phenylephrine         [] Epinephrine    CENTRAL LINES:     [x] No   [] Yes   (Date of Insertion:   )           If yes -     [] Right IJ     [] Left IJ [] Right Femoral [] Left Femoral                   [] Right Subclavian [] Left Subclavian     JACKSON'S CATHETER:   [] No   [x] Yes  (Date of Insertion: 2020)     URINE OUTPUT:            [] Good   [x] Low              [] Anuric    REVIEW OF SYSTEMS:    · Constitutional: No fever, no chills, no change in weight; good appetite  · HEENT: No blurred vision, no ear problems, no sore throat, no rhinorrhea. · Respiratory: No cough, no sputum production, no pleuritic chest pain, no shortness of breath  · Cardiology: No angina, no dyspnea on exertion, no paroxysmal nocturnal dyspnea, no orthopnea, no palpitation, no leg swelling. · Gastroenterology: Polyphagia, no dysphagia, no reflux; no abdominal pain, no nausea or vomiting; no constipation or diarrhea.  No hematochezia   · Genitourinary: Polydipsia and polyuria, no dysuria, no frequency, hesitancy; no hematuria  · Musculoskeletal: no joint pain, no myalgia, no change in range of movement  · Neurology: no focal weakness in extremities, no slurred speech, no double vision, no tingling or numbness sensation  · Endocrinology: no temperature intolerance, no polyphagia, polydipsia or polyuria  · Hematology: no increased bleeding, no bruising, no lymphadenopathy  · Skin: no skin changes noticed by patient  · Psychology: no depressed mood, no suicidal ideation    OBJECTIVE:     VITAL SIGNS:  BP (!) 150/114   Pulse 102   Temp 98.6 °F (37 °C) (Axillary)   Resp 9   Ht 6' (1.829 m)   Wt 253 lb (114.8 kg)   SpO2 93%   BMI 34.31 kg/m²   Tmax over 24 hours:  Temp (24hrs), Av.4 °F (36.3 °C), Min:96.2 °F (35.7 °C), Max:98.6 °F (37 °C)      Patient Vitals for the past 6 hrs:   BP Temp Temp src Pulse Resp SpO2 Height Weight   20 2300 (!) 150/114 -- -- 102 9 93 % -- --   20 2200 (!) 143/102 -- -- 102 16 94 % -- --   20 2100 (!) 135/103 -- -- 106 11 (!) 88 % -- --   20 -- -- -- 108 -- -- -- --   11/01/20 2000 (!) 179/125 98.6 °F (37 °C) Axillary 108 15 95 % 6' (1.829 m) 253 lb (114.8 kg)   11/01/20 1941 (!) 168/119 -- -- 109 13 97 % -- --   11/01/20 1900 (!) 173/132 -- -- 105 13 94 % -- --   11/01/20 1845 (!) 161/136 -- -- 105 17 97 % -- --   11/01/20 1830 (!) 185/136 -- -- 105 18 96 % -- --   11/01/20 1815 (!) 172/126 -- -- 107 14 97 % -- --   11/01/20 1800 (!) 166/128 -- -- 107 14 94 % -- --         Intake/Output Summary (Last 24 hours) at 11/1/2020 2349  Last data filed at 11/1/2020 2200  Gross per 24 hour   Intake 1000 ml   Output 835 ml   Net 165 ml     Wt Readings from Last 2 Encounters:   11/01/20 253 lb (114.8 kg)     Body mass index is 34.31 kg/m².       PHYSICAL EXAMINATION:  General appearance - alert, well appearing, and in no distress  Mental status - alert, oriented to person, place, and time  Eyes - pupils equal and reactive, extraocular eye movements intact  Ears - bilateral TM's and external ear canals normal  Nose - normal and patent, no erythema, discharge or polyps  Mouth - mucous membranes dry, pharynx normal without lesions  Neck - supple, no significant adenopathy  Chest - clear to auscultation, no wheezes, rales or rhonchi, symmetric air entry  Heart - normal rate, regular rhythm, normal S1, S2, no murmurs, rubs, clicks or gallops  Abdomen - soft, nontender, nondistended, no masses or organomegaly  Neurological - alert, oriented, normal speech, no focal findings or movement disorder noted}  Extremities - peripheral pulses normal, no pedal edema, no clubbing or cyanosis  Skin - normal coloration and turgor, no rashes, no suspicious skin lesions noted      Any additional physical findings:    MEDICATIONS:  Scheduled Meds:   sodium chloride flush  10 mL Intravenous 2 times per day    enoxaparin  40 mg Subcutaneous Daily     Continuous Infusions:   dextrose 5% and 0.45% NaCl with KCl 20 mEq      insulin 0.083 Units/kg/hr (11/01/20 2301)    sodium chloride 200 mL/hr at 11/01/20 1942     PRN Meds:   dextrose, 12.5 g, PRN  potassium chloride, 10 mEq, PRN  magnesium sulfate, 1 g, PRN  sodium phosphate IVPB, 10 mmol, PRN    Or  sodium phosphate IVPB, 15 mmol, PRN    Or  sodium phosphate IVPB, 20 mmol, PRN  dextrose 5% and 0.45% NaCl with KCl 20 mEq, , Continuous PRN  sodium chloride flush, 10 mL, PRN  acetaminophen, 650 mg, Q6H PRN    Or  acetaminophen, 650 mg, Q6H PRN  magnesium hydroxide, 30 mL, Daily PRN  promethazine, 12.5 mg, Q6H PRN    Or  ondansetron, 4 mg, Q6H PRN        VENT SETTINGS (Comprehensive) (if applicable):  Vent Information  SpO2: 93 %  Additional Respiratory  Assessments  Pulse: 102  Resp: 9  SpO2: 93 %    ABGs:   Recent Labs     11/01/20  1247   PH 7.298*   PCO2 36.7   PO2 78.0   HCO3 17.6*   BE -8.0*   O2SAT 94.3       Laboratory findings:  Complete Blood Count:   Recent Labs     11/01/20  1212   WBC 10.1   HGB 16.9*   HCT 53.8           Last 3 Blood Glucose:   Recent Labs     11/01/20  1212 11/01/20  1836   GLUCOSE 766* 533*        PT/INR:  No results found for: PROTIME, INR  PTT:  No results found for: APTT, PTT    Comprehensive Metabolic Profile:   Recent Labs     11/01/20  1212  11/01/20  1720 11/01/20  1818 11/01/20  1836   *  --   --   --  161*   K 4.8  --   --   --  5.3*   *  --   --   --  121*   CO2 15*  --   --   --  13*   BUN 51*  --   --   --  46*   CREATININE 1.8*   < > 2.3* 1.9* 1.6*   GLUCOSE 766*  --   --   --  533*   CALCIUM 10.6*  --   --   --  9.9   PROT 8.8*  --   --   --   --    LABALBU 4.6  --   --   --   --    BILITOT 1.1  --   --   --   --    ALKPHOS 109  --   --   --   --    AST 20  --   --   --   --    ALT 61*  --   --   --   --     < > = values in this interval not displayed.       Magnesium:   Lab Results   Component Value Date    MG 3.7 11/01/2020     Phosphorus:   Lab Results   Component Value Date    PHOS 1.7 11/01/2020     Ionized Calcium: No results found for: TAYLER   Troponin:   Recent Labs 11/01/20  1212 11/01/20  2143   TROPONINI <0.01 <0.01       ASSESSMENT  And PLAN:      ASSESSMENT:  1. DKA, new onset diabetes; HbA1c 11.2,  HCO3 15, anion gap 33, beta hydroxybutyrate > 4.5, pH 7.29  2. Hypovolemic hypernatremia, sodium 157, likely secondary to excessive diuresis secondary to hyperglycemia, fluid deficit of at least 12L without considering urine output  3. ANDRAE vs ANDRAE on CKD vs stable CKD, likely hemodynamically mediated volume responsive prerenal ANDRAE  4. Elevated ALT 61, likely secondary to hypovolemia    PLAN:  · Continue DKA protocol  · Change fluids to half-normal saline  · Follow BMP mag Phos every 4 hours  · Rule out any infectious etiology for this acute presentation of DKA (pro-Cachorro, ESR, CRP, urinalysis, blood culture, troponin, EKG abnormality)  · Follow urine electrolytes, urine creatinine, urine urea, osmolality, calculate Fena  · Follow repeat LFT tomorrow      WEAN PER PROTOCOL:  [] No   [x] Yes  [] N/A    ICU PROPHYLAXIS:  Stress ulcer:  [x] PPI Agent  [] [id number] [] Sucralfate  [] Other:  VTE:   [] Enoxaparin  [x] Unfract. Heparin Subcut  [] EPC Cuffs    NUTRITION:  [x] NPO [] Tube Feeding (Specify: ) [] TPN  [] PO (Diet: Diet NPO Effective Now)    INSULIN DRIP:   [] No   [x] Yes    CONSULTATION NEEDED:   [] No   [x] Yes    FAMILY UPDATED:    [x] No   [] Yes    TRANSFER OUT OF ICU:   [x] No   [] Yes    Onur Rhoades MD PGY-2  Attending Physician: Dr. Dylan Talley  11/1/2020, 11:49 PM     I personally saw, examined and provided care for the patient. Radiographs, labs and medication list were reviewed by me independently. I spoke with bedside nursing, therapists and consultants. Critical care services and times documented are independent of procedures and multidisciplinary rounds with Residents. Additionally comprehensive, multidisciplinary rounds were conducted with the MICU team. The case was discussed in detail and plans for care were established.  Review of Residents documentation was

## 2020-11-02 NOTE — PROCEDURES
Central Line Insertion     Procedure: Right femoral vein Triple Lumen Catheter placement. Indications: vascular access, poor peripheral access, hypovolemia and need for frequent blood draws    Consent: The patient provided verbal consent for this procedure. Number of sticks: 1    Number of Kits used: 1    Procedure: Time Out: Immediately prior to the procedure a \"timeout\" was called to verify the correct patient and procedure. The patient was place in the trendelenburg position and the skin over the right femoral vein was prepped with betadine and draped in a sterile fashion. Local anesthesia was obtained by infiltration using 1% Lidocaine without epinephrine. With Ultrasound guidance a large bore needle was used to identify the vein, dark non pulsatile blood returned. The guide wire was then inserted through the needle with minimal resistance. 2 mm nick was made in the skin beside the guidewire. Then a dilator was inserted and removed. A triple lumen catheter was then inserted into the vessel over the guide wire using the Seldinger technique to the  18 cm emily. All ports showed good, free flowing blood return and were flushed with saline solution. The catheter was then securely fastened to the skin with sutures and covered with a bio patch and sterile dressing. A post procedure X-ray was ordered and showed good line position. Complications: None   The patient tolerated the procedure well. Estimated blood loss: 5 ml.     Jordan Duran MD PGY-2  11/2/2020  2:39 AM      Attending: Dr. Rita Vail

## 2020-11-02 NOTE — PROGRESS NOTES
Daily    potassium phosphate IVPB  15 mmol Intravenous Once    insulin glargine  25 Units Subcutaneous Daily    insulin lispro  0-12 Units Subcutaneous TID WC    insulin lispro  0-6 Units Subcutaneous Nightly    sodium chloride flush  10 mL Intravenous 2 times per day    heparin (porcine)  5,000 Units Subcutaneous 3 times per day     PRN Meds: hydrALAZINE, labetalol, glucose, dextrose, glucagon (rDNA), dextrose, dextrose, potassium chloride, magnesium sulfate, sodium phosphate IVPB **OR** sodium phosphate IVPB **OR** sodium phosphate IVPB, sodium chloride flush, acetaminophen **OR** acetaminophen, magnesium hydroxide, promethazine **OR** ondansetron  Nutrition:   NG/OG tube TF type: Pulmocare/Nephro/Glucerna/Jevity        At rate: ml/h    Labs and Imaging Studies     CBC:   Recent Labs     11/01/20  1212   WBC 10.1   HGB 16.9*   HCT 53.8   MCV 91.2          BMP:    Recent Labs     11/02/20  0615 11/02/20  0908 11/02/20  1248   * 166* 160*   K 3.9 4.1 4.4   * 126* 121*   CO2 26 27 27   BUN 47* 48* 47*   CREATININE 1.8* 1.8* 1.9*   GLUCOSE 237* 179* 233*       LIVER PROFILE:   Recent Labs     11/01/20  1212 11/02/20  0615   AST 20 31   ALT 61* 51*   BILIDIR  --  <0.2   BILITOT 1.1 0.3   ALKPHOS 109 89       PT/INR:   Recent Labs     11/02/20  0615   PROTIME 11.2   INR 1.0       APTT:   Recent Labs     11/02/20  0615   APTT 23.4*       Fasting Lipid Panel:    No results found for: CHOL, TRIG, HDL    Cardiac Enzymes:    Lab Results   Component Value Date    TROPONINI <0.01 11/02/2020    TROPONINI <0.01 11/01/2020    TROPONINI <0.01 11/01/2020       Notable Cultures:      Blood cultures No results found for: [facility]  Respiratory cultures No results found for: RESPCULTURE No results found for: LABGRAM  Urine   Urine Culture, Routine   Date Value Ref Range Status   11/01/2020 Growth not present, incubation continues  Preliminary     Legionella No results found for: LABLEGI  C Diff PCR No results found for: CDIFPCR  Wound culture/abscess: No results for input(s): WNDABS in the last 72 hours. Tip culture:No results for input(s): CXCATHTIP in the last 72 hours. Antibiotic  Days  Day started                                Oxygen:     Vent Information  SpO2: 96 %  Additional Respiratory  Assessments  Pulse: 67  Resp: 18  SpO2: 96 %  Oral Care: Mouth swabbed, Mouth moisturizer, Mouth suctioned     Nasal cannula L/min     Face mask %     Reservoirs mask %       ABG   Vent Settings     PH   Mode      PCO2   TV      PO2   RR      HCO3   PS      Sat%   PEEP      FIO2   FIO2        P/F          Lines:  Site  Day  Date inserted     TLC              PICC              Arterial line              Peripheral line              HD cath            Urethral Catheter Temperature probe 16 fr-Output (mL): 75 mL    Imaging Studies:      Resident's Assessment and Plan     ASSESSMENT:  1. DKA, new onset diabetes; HbA1c 11.2,  HCO3 15, anion gap 33, beta hydroxybutyrate > 4.5, pH 7.29  2. Hypovolemic hypernatremia, sodium 157 on admission, likely secondary to excessive diuresis secondary to hyperglycemia, fluid deficit of at least 12L without considering urine output, Current Na 160, FWD 4.3L  3. ANDRAE vs ANDRAE on CKD vs stable CKD, likely hemodynamically mediated volume responsive prerenal ANDRAE  4. Elevated ALT 61, likely secondary to hypovolemia     PLAN:  · Bridged with 25U lantus and MDSS  Continue monitoring POCT q1 hrs x4 then switch to ACHS   · Continue D5W @ 200cc/hr per nephro   · BMP q4 hrs  · F/u cx      # Peptic ulcer prophylaxis: diet  # DVT Prophylaxis: heparin   # Disposition: Transfer to Seema Maloney M.D., PGY-2    Attending Physician: Dr. Kim Peterson    I personally saw, examined and provided care for the patient. Radiographs, labs and medication list were reviewed by me independently. I spoke with bedside nursing, therapists and consultants.  Critical care services and times documented are independent of procedures

## 2020-11-02 NOTE — H&P
Patient has no known allergies. Social History:   TOBACCO:   reports that he has never smoked. He has never used smokeless tobacco.  ETOH:   reports current alcohol use. Family History:       Problem Relation Age of Onset    Diabetes Mother        REVIEW OF SYSTEMS:    · Constitutional: No fever, no chills, no change in weight; good appetite  · HEENT: No blurred vision, no ear problems, no sore throat, no rhinorrhea. · Respiratory: No cough, no sputum production, no pleuritic chest pain, no shortness of breath  · Cardiology: No angina, no dyspnea on exertion, no paroxysmal nocturnal dyspnea, no orthopnea, no palpitation, no leg swelling. · Gastroenterology: No dysphagia, no reflux; no abdominal pain, no nausea or vomiting; no constipation or diarrhea. No hematochezia   · Genitourinary: No dysuria, no frequency, hesitancy; no hematuria  · Musculoskeletal: no joint pain, no myalgia, no change in range of movement  · Neurology: no focal weakness in extremities, no slurred speech, no double vision, no tingling or numbness sensation  · Endocrinology: no temperature intolerance,   · Hematology: no increased bleeding, no bruising, no lymphadenopathy  · Skin: no skin changes noticed by patient  · Psychology: no depressed mood, no suicidal ideation    Physical Exam   · Vitals: BP (!) 135/103   Pulse 106   Temp 98.6 °F (37 °C) (Axillary)   Resp 11   Ht 6' (1.829 m)   Wt 253 lb (114.8 kg)   SpO2 (!) 88%   BMI 34.31 kg/m²     · General Appearance: alert and oriented to person, place and time, well developed and well- nourished, ill appearance.   · Skin: dry mucous membrane, no rash or erythema  · Head: normocephalic and atraumatic  · Eyes: pupils equal, round, and reactive to light, extraocular eye movements intact, conjunctivae normal  · ENT: tympanic membrane, external ear and ear canal normal bilaterally, nose without deformity, nasal mucosa and turbinates normal without polyps  · Neck: supple and non-tender without mass, no thyromegaly or thyroid nodules, no cervical lymphadenopathy  · Pulmonary/Chest: clear to auscultation bilaterally- no wheezes, rales or rhonchi, normal air movement, no respiratory distress  · Cardiovascular: normal rate, regular rhythm, normal S1 and S2, no murmurs, rubs, clicks, or gallops, distal pulses intact, no carotid bruits  · Abdomen: soft, non-tender, non-distended, normal bowel sounds, no masses or organomegaly  · Extremities: no cyanosis, clubbing or edema  · Musculoskeletal: normal range of motion, no joint swelling, deformity or tenderness  · Neurologic: reflexes normal and symmetric, no cranial nerve deficit, gait, coordination and speech normal   Labs and Imaging Studies   Basic Labs  Recent Labs     11/01/20  1212  11/01/20  1720 11/01/20  1818 11/01/20  1836   *  --   --   --  161*   K 4.8  --   --   --  5.3*   *  --   --   --  121*   CO2 15*  --   --   --  13*   BUN 51*  --   --   --  46*   CREATININE 1.8*   < > 2.3* 1.9* 1.6*   GLUCOSE 766*  --   --   --  533*   CALCIUM 10.6*  --   --   --  9.9    < > = values in this interval not displayed. Recent Labs     11/01/20  1212   WBC 10.1   RBC 5.90*   HGB 16.9*   HCT 53.8   MCV 91.2   MCH 28.6   MCHC 31.4*   RDW 13.7      MPV 11.1           Imaging Studies:     Ct Head Wo Contrast    Result Date: 11/1/2020  EXAMINATION: CT OF THE HEAD WITHOUT CONTRAST  11/1/2020 1:42 pm TECHNIQUE: CT of the head was performed without the administration of intravenous contrast. Dose modulation, iterative reconstruction, and/or weight based adjustment of the mA/kV was utilized to reduce the radiation dose to as low as reasonably achievable. COMPARISON: None. HISTORY: ORDERING SYSTEM PROVIDED HISTORY: blurred vision, dizziness TECHNOLOGIST PROVIDED HISTORY: Reason for exam:->blurred vision, dizziness Has a \"code stroke\" or \"stroke alert\" been called? ->No What reading provider will be dictating this exam?->CRC FINDINGS: BRAIN/VENTRICLES: No mass effect, edema or hemorrhage is seen. The brain is normal in volume. Mild chronic microvascular ischemic changes are noted. ORBITS: The visualized portion of the orbits demonstrate no acute abnormality. SINUSES: The visualized paranasal sinuses and mastoid air cells demonstrate no acute abnormality. SOFT TISSUES/SKULL:  No acute abnormality of the visualized skull or soft tissues. No acute intracranial abnormality. Xr Chest Portable    Result Date: 11/1/2020  EXAMINATION: ONE XRAY VIEW OF THE CHEST 11/1/2020 12:17 pm COMPARISON: None available to me at the time of this interpretation. HISTORY: ORDERING SYSTEM PROVIDED HISTORY: shortness of breath TECHNOLOGIST PROVIDED HISTORY: Reason for exam:->shortness of breath What reading provider will be dictating this exam?->CRC FINDINGS: Multiple cardiac monitoring leads overlie the patient's chest. Apparent borderline cardiomegaly and top-normal central pulmonary vascular fullness are likely artifactually-accentuated by moderate bilateral pulmonary hypoinflation. Both lungs are otherwise grossly clear. Minimal to mild scattered osseous degenerative disease is most notable at the right acromioclavicular joint, where there is slight chronic fragmentation. No other plain radiographic abnormalities are noted. .     1. Slightly limited exam, grossly negative for acute process. .       EKG: normal sinus rhythm. Resident's Assessment and Plan       Madelaine White is a 53 y.o. male with  has a past medical history of Prediabetes. Code Status: Full  Currently being managed for : (Active Problems)    DKA, new onset  Patient presented with DKA picture, including elevated beta hydroxybutyrate to >4.5, elevated anion gap, glucose level of 766, and ABG shows pH of 7.29, PCO2 of 36.7 PO2 78 and bicarbonate of 17.6, indicative of metabolic acidosis.   DKA management per MICU, patient will be started with insulin drip, IVF, potassium replacement and other electrolytes replacement as needed, monitor for closing of the anion gap, replace of glucose levels if gap is now closed the serum glucose level is less than 250, after gap is closed, start to overlapping bridge to subcutaneous of insulin  BMP monitored every 4 hours  We will have to rule out any common inciting events that provoking DKA including infectious, MI, stroke, or head injury, or diabetes medication non-compliance  Patient is prediabetes, is not on any medication including Metformin, A1c is unknown. Infectious work-up including urinary analysis, blood cultures, respiratory viral panel, respiratory culture, and nares MRSA culture, procalcitonin ESR CRP measurement. Monitor daily CBC particularly any signs of leukocytosis, temperature  Screening troponin and EKG for any signs of MI  Pt reports no head injury nor any sign of CVA     ANDRAE stage 1, new onset, likely prerenal secondary to dehydration  On presentation, creatinine is 1.8. Baseline is unknown  Likely prerenal secondary to dehydration due to DKA causing also loss of substantial intracellular and extracellular fluid  IV fluid  Monitor BMP every 4 hours, reassess when indicated    Hyernatremia ? Secondary to hemoconcentration  On presentation sodium level is 157  Calculate fluid gap and correct sodium levels  IV fluid and monitor BMP every 4 hours    PT/OT evaluation: not indicated  DVT prophylaxis/ GI prophylaxis: heparin/protonix  Disposition Plan: admitted to ICU    Final Note:     Cal Christiansen MD, PGY-1   Attending physician: Dr. Karina Nicholas  Department of Internal Medicine  Internal Medicine Residency / [address] [facility] [facility]    Attending Physician Statement    Stormy Dancer case was discussed, including pertinent history and examination findings with the multidisciplinary team during bedside rounds. I have seen and examined the patient and the key elements of the encounter have been performed by myself. I have read and reviewed the documentation. If needed or disputed the following findings, counseling and treatment options which have been corrected and communicated back to the patient, family if applicable and contributing physicians. I agree with the assessment, plan and orders as noted by the resident.       Nasir Francisco DO , Prosper Mckeon  Professor of Medicine  Pulmonary, [facility]  Internal Medicine Academic Faculty

## 2020-11-02 NOTE — PROGRESS NOTES
[facility]  Internal Medicine Residency Program  Progress Note - House Team 1    Patient:  Krissy Zamorano 53 y.o. male MRN: [id number]     Date of Service: 11/2/2020     CC: Fatigue and shortness of breath  Overnight events: No acute event overnight    Subjective     Patient seen and examined at bedside this AM.    Patient lying in bed comfortably, alert and oriented x3. Denies SOB, chest pain, abdominal pain, nausea, or vomiting. Objective     Physical Exam:  · Vitals: BP (!) 191/116   Pulse 73   Temp 99.1 °F (37.3 °C) (Bladder)   Resp 15   Ht 6' (1.829 m)   Wt 253 lb (114.8 kg)   SpO2 96%   BMI 34.31 kg/m²     · I & O - 24hr: No intake/output data recorded. · General Appearance: alert, appears stated age, cooperative and no distress  · HEENT:  Head: Normal, normocephalic, atraumatic. · Eye: Normal external eye, conjunctiva, lids cornea, RANDI. · Neck: no adenopathy, no JVD, supple, symmetrical, trachea midline and thyroid not enlarged, symmetric, no tenderness/mass/nodules  · Lung: clear to auscultation bilaterally  · Heart: regular rate and rhythm, S1, S2 normal, no murmur, click, rub or gallop  · Abdomen: soft, non-tender; bowel sounds normal; no masses,  no organomegaly  · Extremities:  extremities normal, atraumatic, no cyanosis or edema  · Musculokeletal: No joint swelling, no muscle tenderness. ROM normal in all joints of extremities.    · Neurologic: Mental status: Alert, oriented, thought content appropriate  Subject  Pertinent Labs & Imaging Studies   amy  CBC with Differential:    Lab Results   Component Value Date    WBC 10.1 11/01/2020    RBC 5.90 11/01/2020    HGB 16.9 11/01/2020    HCT 53.8 11/01/2020     11/01/2020    MCV 91.2 11/01/2020    MCH 28.6 11/01/2020    MCHC 31.4 11/01/2020    RDW 13.7 11/01/2020    LYMPHOPCT 10.8 11/01/2020    MONOPCT 6.4 11/01/2020    BASOPCT 0.1 11/01/2020    MONOSABS 0.65 11/01/2020    LYMPHSABS 1.09 11/01/2020    EOSABS 0.00 11/01/2020    BASOSABS 0.01 11/01/2020     CMP:    Lab Results   Component Value Date     11/02/2020    K 4.8 11/02/2020     11/02/2020    CO2 23 11/02/2020    BUN 49 11/02/2020    CREATININE 1.8 11/02/2020    GFRAA 47 11/02/2020    LABGLOM 47 11/02/2020    GLUCOSE 419 11/02/2020    PROT 7.4 11/02/2020    LABALBU 4.1 11/02/2020    CALCIUM 9.4 11/02/2020    BILITOT 0.3 11/02/2020    ALKPHOS 89 11/02/2020    AST 31 11/02/2020    ALT 51 11/02/2020       Resident's Assessment and Plan     Roya Bowling is a 53 y.o. male with a past medical history of prediabetes, who presented on 11/01/2020 with generalized fatigue, polyuria, polydipsia, and polyphagia for 3 to 4 days. 1.  DKA   -Newly diagnosed diabetic mellitus not on medication   -Blood glucose 766, anion gap 33, bicarb 15, beta-hydroxybutyrate >4.5, urine ketone >80   -Insulin drip initiated   -Anion gap closed x2, bridge to subcutaneous insulin, currently on Lantus 30 units daily and high-dose SS   -Follow POCT glucose AC/HS, BMP, Mg and Phos every 4 hours   -Follow Cx    2. Diabetes mellitus likely mixed type I and type II   -Hemoglobin A1c 11.2, not on medication   -Late onset diabetes mellitus   -Presented with DKA with blood glucose 766   -Follow JUNIE antibody, lipid panel   -Diabetic education     3. ANDRAE vs ANDRAE on CKD vs stable CKD, likely hemodynamically mediated   -Baseline creatinine level unknown   -Creatinine 1.8 on presentation   -Creatinine stable despite IVF administration   -Continue volume resuscitation   -Follow renal ultrasound    4. Hypovolemic hypernatremia likely 2/2 excessive diuresis 2/2 hypoglycemia   -Sodium 157 on presentation, corrected sodium level 168   -Monitor sodium level   -Continue D5W at 200 cc/h per nephro   -Encourage p.o. free water intake   -Nephrology following    5. ?Hypertension   -/125 on presentation and >130/80 on multiple measures   -On hydralazine and labetalol as needed    6. Hypophosphatemia   -Replace as needed    PT/OT evaluation: Not indicated at this time  DVT prophylaxis/ GI prophylaxis: Heparin/diet  Disposition: Continue inpatient management    Ashleigh Watkins MD, PGY-1  Attending physician: Dr. Leander Klinefelter  Department of Internal Medicine  Internal Medicine Residency / [address]    Attending Physician Statement    August Dickens case was discussed, including pertinent history and examination findings with the multidisciplinary team during bedside rounds. I have seen and examined the patient and the key elements of the encounter have been performed by myself. I have read and reviewed the documentation. If needed or disputed the following findings, counseling and treatment options which have been corrected and communicated back to the patient, family if applicable and contributing physicians. I agree with the assessment, plan and orders as noted by the resident.       Rajni Carmona DO, Quincy Muzzy  Professor of Medicine  Pulmonary, [facility]  Internal Medicine Academic Faculty

## 2020-11-02 NOTE — CARE COORDINATION
11/2 Care Coordination: Pt in ED for feeling quite fatigue and short of breath. He is a  from [facility] past medical history. Found to have glucose >700, Na 157,Cr 1.8. Admit to MICU, DKA, Renal consult. CM/SW will continue to follow for discharge planning.    Diogo BELLA,RN--[phone]

## 2020-11-03 ENCOUNTER — APPOINTMENT (OUTPATIENT)
Dept: ULTRASOUND IMAGING | Age: 55
DRG: 420 | End: 2020-11-03
Payer: MEDICAID

## 2020-11-03 LAB
ALBUMIN SERPL-MCNC: 3.9 G/DL (ref 3.5–5.2)
ALP BLD-CCNC: 88 U/L (ref 40–129)
ALT SERPL-CCNC: 49 U/L (ref 0–40)
ANION GAP SERPL CALCULATED.3IONS-SCNC: 12 MMOL/L (ref 7–16)
ANION GAP SERPL CALCULATED.3IONS-SCNC: 13 MMOL/L (ref 7–16)
ANION GAP SERPL CALCULATED.3IONS-SCNC: 14 MMOL/L (ref 7–16)
ANION GAP SERPL CALCULATED.3IONS-SCNC: 15 MMOL/L (ref 7–16)
ANION GAP SERPL CALCULATED.3IONS-SCNC: 15 MMOL/L (ref 7–16)
ANION GAP SERPL CALCULATED.3IONS-SCNC: 16 MMOL/L (ref 7–16)
APTT: 23.4 SEC (ref 24.5–35.1)
AST SERPL-CCNC: 38 U/L (ref 0–39)
BASOPHILS ABSOLUTE: 0.01 E9/L (ref 0–0.2)
BASOPHILS RELATIVE PERCENT: 0.1 % (ref 0–2)
BETA-HYDROXYBUTYRATE: 0.31 MMOL/L (ref 0.02–0.27)
BILIRUB SERPL-MCNC: 0.4 MG/DL (ref 0–1.2)
BILIRUBIN DIRECT: <0.2 MG/DL (ref 0–0.3)
BILIRUBIN, INDIRECT: ABNORMAL MG/DL (ref 0–1)
BUN BLDV-MCNC: 38 MG/DL (ref 6–20)
BUN BLDV-MCNC: 39 MG/DL (ref 6–20)
BUN BLDV-MCNC: 42 MG/DL (ref 6–20)
BUN BLDV-MCNC: 42 MG/DL (ref 6–20)
BUN BLDV-MCNC: 43 MG/DL (ref 6–20)
BUN BLDV-MCNC: 45 MG/DL (ref 6–20)
CALCIUM SERPL-MCNC: 8.7 MG/DL (ref 8.6–10.2)
CALCIUM SERPL-MCNC: 8.9 MG/DL (ref 8.6–10.2)
CALCIUM SERPL-MCNC: 9.3 MG/DL (ref 8.6–10.2)
CALCIUM SERPL-MCNC: 9.3 MG/DL (ref 8.6–10.2)
CALCIUM SERPL-MCNC: 9.4 MG/DL (ref 8.6–10.2)
CALCIUM SERPL-MCNC: 9.6 MG/DL (ref 8.6–10.2)
CHLORIDE BLD-SCNC: 114 MMOL/L (ref 98–107)
CHLORIDE BLD-SCNC: 117 MMOL/L (ref 98–107)
CHLORIDE BLD-SCNC: 119 MMOL/L (ref 98–107)
CHLORIDE BLD-SCNC: 122 MMOL/L (ref 98–107)
CHLORIDE BLD-SCNC: 123 MMOL/L (ref 98–107)
CHLORIDE BLD-SCNC: 124 MMOL/L (ref 98–107)
CHOLESTEROL, FASTING: 173 MG/DL (ref 0–199)
CO2: 22 MMOL/L (ref 22–29)
CO2: 23 MMOL/L (ref 22–29)
CO2: 24 MMOL/L (ref 22–29)
CO2: 24 MMOL/L (ref 22–29)
CO2: 25 MMOL/L (ref 22–29)
CO2: 26 MMOL/L (ref 22–29)
CREAT SERPL-MCNC: 1.5 MG/DL (ref 0.7–1.2)
CREAT SERPL-MCNC: 1.6 MG/DL (ref 0.7–1.2)
CREAT SERPL-MCNC: 1.6 MG/DL (ref 0.7–1.2)
CREAT SERPL-MCNC: 1.7 MG/DL (ref 0.7–1.2)
CREAT SERPL-MCNC: 1.7 MG/DL (ref 0.7–1.2)
CREAT SERPL-MCNC: 1.8 MG/DL (ref 0.7–1.2)
CREATININE URINE: 212 MG/DL (ref 40–278)
EOSINOPHILS ABSOLUTE: 0.01 E9/L (ref 0.05–0.5)
EOSINOPHILS RELATIVE PERCENT: 0.1 % (ref 0–6)
GFR AFRICAN AMERICAN: 47
GFR AFRICAN AMERICAN: 51
GFR AFRICAN AMERICAN: 51
GFR AFRICAN AMERICAN: 54
GFR AFRICAN AMERICAN: 54
GFR AFRICAN AMERICAN: 59
GFR NON-AFRICAN AMERICAN: 47 ML/MIN/1.73
GFR NON-AFRICAN AMERICAN: 51 ML/MIN/1.73
GFR NON-AFRICAN AMERICAN: 51 ML/MIN/1.73
GFR NON-AFRICAN AMERICAN: 54 ML/MIN/1.73
GFR NON-AFRICAN AMERICAN: 54 ML/MIN/1.73
GFR NON-AFRICAN AMERICAN: 59 ML/MIN/1.73
GLUCOSE BLD-MCNC: 145 MG/DL (ref 74–99)
GLUCOSE BLD-MCNC: 148 MG/DL (ref 74–99)
GLUCOSE BLD-MCNC: 231 MG/DL (ref 74–99)
GLUCOSE BLD-MCNC: 256 MG/DL (ref 74–99)
GLUCOSE BLD-MCNC: 261 MG/DL (ref 74–99)
GLUCOSE BLD-MCNC: 264 MG/DL (ref 74–99)
GLUCOSE BLD-MCNC: 323 MG/DL (ref 74–99)
GLUCOSE BLD-MCNC: 394 MG/DL (ref 74–99)
HCT VFR BLD CALC: 51.9 % (ref 37–54)
HDLC SERPL-MCNC: 51 MG/DL
HEMOGLOBIN: 16.4 G/DL (ref 12.5–16.5)
IMMATURE GRANULOCYTES #: 0.09 E9/L
IMMATURE GRANULOCYTES %: 0.8 % (ref 0–5)
INR BLD: 1
LDL CHOLESTEROL CALCULATED: 94 MG/DL (ref 0–99)
LYMPHOCYTES ABSOLUTE: 1.32 E9/L (ref 1.5–4)
LYMPHOCYTES RELATIVE PERCENT: 11.3 % (ref 20–42)
MAGNESIUM: 2.6 MG/DL (ref 1.6–2.6)
MAGNESIUM: 2.6 MG/DL (ref 1.6–2.6)
MAGNESIUM: 2.8 MG/DL (ref 1.6–2.6)
MAGNESIUM: 2.9 MG/DL (ref 1.6–2.6)
MAGNESIUM: 3 MG/DL (ref 1.6–2.6)
MAGNESIUM: 3.1 MG/DL (ref 1.6–2.6)
MCH RBC QN AUTO: 28.9 PG (ref 26–35)
MCHC RBC AUTO-ENTMCNC: 31.6 % (ref 32–34.5)
MCV RBC AUTO: 91.5 FL (ref 80–99.9)
METER GLUCOSE: 151 MG/DL (ref 74–99)
METER GLUCOSE: 158 MG/DL (ref 74–99)
METER GLUCOSE: 159 MG/DL (ref 74–99)
METER GLUCOSE: 180 MG/DL (ref 74–99)
METER GLUCOSE: 189 MG/DL (ref 74–99)
METER GLUCOSE: 218 MG/DL (ref 74–99)
METER GLUCOSE: 222 MG/DL (ref 74–99)
METER GLUCOSE: 225 MG/DL (ref 74–99)
METER GLUCOSE: 241 MG/DL (ref 74–99)
METER GLUCOSE: 248 MG/DL (ref 74–99)
METER GLUCOSE: 251 MG/DL (ref 74–99)
METER GLUCOSE: 261 MG/DL (ref 74–99)
METER GLUCOSE: 267 MG/DL (ref 74–99)
METER GLUCOSE: 287 MG/DL (ref 74–99)
METER GLUCOSE: 311 MG/DL (ref 74–99)
METER GLUCOSE: 314 MG/DL (ref 74–99)
METER GLUCOSE: 330 MG/DL (ref 74–99)
METER GLUCOSE: 372 MG/DL (ref 74–99)
METER GLUCOSE: 486 MG/DL (ref 74–99)
MICROALBUMIN UR-MCNC: 39.9 MG/L
MICROALBUMIN/CREAT UR-RTO: 18.8 (ref 0–30)
MONOCYTES ABSOLUTE: 0.92 E9/L (ref 0.1–0.95)
MONOCYTES RELATIVE PERCENT: 7.9 % (ref 2–12)
MRSA CULTURE ONLY: NORMAL
NEUTROPHILS ABSOLUTE: 9.35 E9/L (ref 1.8–7.3)
NEUTROPHILS RELATIVE PERCENT: 79.8 % (ref 43–80)
PDW BLD-RTO: 14.2 FL (ref 11.5–15)
PHOSPHORUS: 3 MG/DL (ref 2.5–4.5)
PHOSPHORUS: 3 MG/DL (ref 2.5–4.5)
PHOSPHORUS: 3.1 MG/DL (ref 2.5–4.5)
PHOSPHORUS: 3.2 MG/DL (ref 2.5–4.5)
PHOSPHORUS: 3.4 MG/DL (ref 2.5–4.5)
PHOSPHORUS: 3.6 MG/DL (ref 2.5–4.5)
PHOSPHORUS: 4 MG/DL (ref 2.5–4.5)
PHOSPHORUS: 4.1 MG/DL (ref 2.5–4.5)
PLATELET # BLD: 188 E9/L (ref 130–450)
PMV BLD AUTO: 10.7 FL (ref 7–12)
POTASSIUM SERPL-SCNC: 3.8 MMOL/L (ref 3.5–5)
POTASSIUM SERPL-SCNC: 4 MMOL/L (ref 3.5–5)
POTASSIUM SERPL-SCNC: 4.1 MMOL/L (ref 3.5–5)
POTASSIUM SERPL-SCNC: 4.5 MMOL/L (ref 3.5–5)
POTASSIUM SERPL-SCNC: 4.6 MMOL/L (ref 3.5–5)
POTASSIUM SERPL-SCNC: 4.9 MMOL/L (ref 3.5–5)
PROTHROMBIN TIME: 11.8 SEC (ref 9.3–12.4)
RBC # BLD: 5.67 E12/L (ref 3.8–5.8)
SODIUM BLD-SCNC: 153 MMOL/L (ref 132–146)
SODIUM BLD-SCNC: 156 MMOL/L (ref 132–146)
SODIUM BLD-SCNC: 157 MMOL/L (ref 132–146)
SODIUM BLD-SCNC: 157 MMOL/L (ref 132–146)
SODIUM BLD-SCNC: 159 MMOL/L (ref 132–146)
SODIUM BLD-SCNC: 162 MMOL/L (ref 132–146)
SODIUM BLD-SCNC: 164 MMOL/L (ref 132–146)
SODIUM BLD-SCNC: 164 MMOL/L (ref 132–146)
TOTAL PROTEIN: 7.1 G/DL (ref 6.4–8.3)
TRIGLYCERIDE, FASTING: 139 MG/DL (ref 0–149)
URINE CULTURE, ROUTINE: NORMAL
VLDLC SERPL CALC-MCNC: 28 MG/DL
WBC # BLD: 11.7 E9/L (ref 4.5–11.5)

## 2020-11-03 PROCEDURE — 80048 BASIC METABOLIC PNL TOTAL CA: CPT

## 2020-11-03 PROCEDURE — 85025 COMPLETE CBC W/AUTO DIFF WBC: CPT

## 2020-11-03 PROCEDURE — 80076 HEPATIC FUNCTION PANEL: CPT

## 2020-11-03 PROCEDURE — 85610 PROTHROMBIN TIME: CPT

## 2020-11-03 PROCEDURE — 2500000003 HC RX 250 WO HCPCS: Performed by: INTERNAL MEDICINE

## 2020-11-03 PROCEDURE — 2580000003 HC RX 258: Performed by: INTERNAL MEDICINE

## 2020-11-03 PROCEDURE — 99233 SBSQ HOSP IP/OBS HIGH 50: CPT | Performed by: INTERNAL MEDICINE

## 2020-11-03 PROCEDURE — 82962 GLUCOSE BLOOD TEST: CPT

## 2020-11-03 PROCEDURE — 82044 UR ALBUMIN SEMIQUANTITATIVE: CPT

## 2020-11-03 PROCEDURE — 80061 LIPID PANEL: CPT

## 2020-11-03 PROCEDURE — 6370000000 HC RX 637 (ALT 250 FOR IP): Performed by: INTERNAL MEDICINE

## 2020-11-03 PROCEDURE — 76770 US EXAM ABDO BACK WALL COMP: CPT

## 2020-11-03 PROCEDURE — 84100 ASSAY OF PHOSPHORUS: CPT

## 2020-11-03 PROCEDURE — 83516 IMMUNOASSAY NONANTIBODY: CPT

## 2020-11-03 PROCEDURE — 6360000002 HC RX W HCPCS: Performed by: INTERNAL MEDICINE

## 2020-11-03 PROCEDURE — 2060000000 HC ICU INTERMEDIATE R&B

## 2020-11-03 PROCEDURE — 85730 THROMBOPLASTIN TIME PARTIAL: CPT

## 2020-11-03 PROCEDURE — 82010 KETONE BODYS QUAN: CPT

## 2020-11-03 PROCEDURE — 36415 COLL VENOUS BLD VENIPUNCTURE: CPT

## 2020-11-03 PROCEDURE — 82570 ASSAY OF URINE CREATININE: CPT

## 2020-11-03 PROCEDURE — 83735 ASSAY OF MAGNESIUM: CPT

## 2020-11-03 RX ORDER — INSULIN GLARGINE 100 [IU]/ML
35 INJECTION, SOLUTION SUBCUTANEOUS DAILY
Status: DISCONTINUED | OUTPATIENT
Start: 2020-11-03 | End: 2020-11-04

## 2020-11-03 RX ORDER — INSULIN GLARGINE 100 [IU]/ML
10 INJECTION, SOLUTION SUBCUTANEOUS NIGHTLY
Status: COMPLETED | OUTPATIENT
Start: 2020-11-03 | End: 2020-11-03

## 2020-11-03 RX ORDER — SODIUM CHLORIDE 450 MG/100ML
INJECTION, SOLUTION INTRAVENOUS CONTINUOUS
Status: DISCONTINUED | OUTPATIENT
Start: 2020-11-03 | End: 2020-11-03

## 2020-11-03 RX ORDER — SODIUM CHLORIDE 450 MG/100ML
INJECTION, SOLUTION INTRAVENOUS CONTINUOUS
Status: DISCONTINUED | OUTPATIENT
Start: 2020-11-03 | End: 2020-11-04

## 2020-11-03 RX ADMIN — POTASSIUM CHLORIDE 10 MEQ: 10 INJECTION, SOLUTION INTRAVENOUS at 06:15

## 2020-11-03 RX ADMIN — ASPIRIN 81 MG CHEWABLE TABLET 81 MG: 81 TABLET CHEWABLE at 08:14

## 2020-11-03 RX ADMIN — POTASSIUM CHLORIDE 10 MEQ: 10 INJECTION, SOLUTION INTRAVENOUS at 01:17

## 2020-11-03 RX ADMIN — INSULIN LISPRO 6 UNITS: 100 INJECTION, SOLUTION INTRAVENOUS; SUBCUTANEOUS at 12:29

## 2020-11-03 RX ADMIN — POTASSIUM CHLORIDE 10 MEQ: 10 INJECTION, SOLUTION INTRAVENOUS at 02:20

## 2020-11-03 RX ADMIN — SODIUM CHLORIDE 2.73 UNITS/HR: 9 INJECTION, SOLUTION INTRAVENOUS at 04:42

## 2020-11-03 RX ADMIN — INSULIN LISPRO 6 UNITS: 100 INJECTION, SOLUTION INTRAVENOUS; SUBCUTANEOUS at 17:15

## 2020-11-03 RX ADMIN — INSULIN LISPRO 10 UNITS: 100 INJECTION, SOLUTION INTRAVENOUS; SUBCUTANEOUS at 16:23

## 2020-11-03 RX ADMIN — INSULIN LISPRO 6 UNITS: 100 INJECTION, SOLUTION INTRAVENOUS; SUBCUTANEOUS at 20:07

## 2020-11-03 RX ADMIN — INSULIN GLARGINE 35 UNITS: 100 INJECTION, SOLUTION SUBCUTANEOUS at 12:29

## 2020-11-03 RX ADMIN — SODIUM CHLORIDE: 4.5 INJECTION, SOLUTION INTRAVENOUS at 19:58

## 2020-11-03 RX ADMIN — LABETALOL HYDROCHLORIDE 10 MG: 5 INJECTION, SOLUTION INTRAVENOUS at 16:23

## 2020-11-03 RX ADMIN — INSULIN GLARGINE 10 UNITS: 100 INJECTION, SOLUTION SUBCUTANEOUS at 20:07

## 2020-11-03 RX ADMIN — Medication 10 ML: at 20:08

## 2020-11-03 RX ADMIN — POTASSIUM CHLORIDE 10 MEQ: 10 INJECTION, SOLUTION INTRAVENOUS at 08:14

## 2020-11-03 RX ADMIN — ENOXAPARIN SODIUM 40 MG: 40 INJECTION SUBCUTANEOUS at 08:14

## 2020-11-03 RX ADMIN — POTASSIUM CHLORIDE 10 MEQ: 10 INJECTION, SOLUTION INTRAVENOUS at 03:28

## 2020-11-03 RX ADMIN — HYDRALAZINE HYDROCHLORIDE 10 MG: 20 INJECTION, SOLUTION INTRAMUSCULAR; INTRAVENOUS at 09:26

## 2020-11-03 RX ADMIN — LABETALOL HYDROCHLORIDE 10 MG: 5 INJECTION, SOLUTION INTRAVENOUS at 20:24

## 2020-11-03 ASSESSMENT — PAIN SCALES - GENERAL
PAINLEVEL_OUTOF10: 0

## 2020-11-03 NOTE — PLAN OF CARE
Problem: Serum Glucose Level - Abnormal:  Goal: Ability to maintain appropriate glucose levels will improve  Description: Ability to maintain appropriate glucose levels will improve  11/3/2020 0356 by Katelin Lester RN  Outcome: Not Met This Shift     Problem: Falls - Risk of:  Goal: Absence of physical injury  Description: Absence of physical injury  11/3/2020 0356 by Katelin Lester RN  Outcome: Met This Shift     Problem: Falls - Risk of:  Goal: Will remain free from falls  Description: Will remain free from falls  11/3/2020 0356 by Katelin Lester RN  Outcome: Met This Shift

## 2020-11-03 NOTE — PROGRESS NOTES
dextrose 5 % and 0.45 % NaCl      insulin Stopped (11/03/20 4147)     Scheduled Meds:   insulin glargine  35 Units Subcutaneous Daily    insulin lispro  0-18 Units Subcutaneous TID WC    insulin lispro  0-9 Units Subcutaneous Nightly    aspirin  81 mg Oral Daily    enoxaparin  40 mg Subcutaneous Daily    sodium chloride flush  10 mL Intravenous 2 times per day     PRN Meds: hydrALAZINE, labetalol, glucose, glucagon (rDNA), dextrose, dextrose, potassium chloride, magnesium sulfate, sodium phosphate IVPB **OR** sodium phosphate IVPB **OR** sodium phosphate IVPB, dextrose 5 % and 0.45 % NaCl, dextrose, sodium chloride flush, acetaminophen **OR** acetaminophen, magnesium hydroxide, promethazine **OR** ondansetron  Nutrition:   NG/OG tube TF type: Pulmocare/Nephro/Glucerna/Jevity        At rate: ml/h    Labs and Imaging Studies     CBC:   Recent Labs     11/01/20  1212 11/03/20  0602   WBC 10.1 11.7*   HGB 16.9* 16.4   HCT 53.8 51.9   MCV 91.2 91.5    188       BMP:    Recent Labs     11/03/20  0602 11/03/20  0810 11/03/20  1221   * 162* 157*   K 4.6 4.5 4.0   * 123* 119*   CO2 25 25 24   BUN 43* 42* 42*   CREATININE 1.7* 1.7* 1.6*   GLUCOSE 145* 231* 264*       LIVER PROFILE:   Recent Labs     11/01/20  1212 11/02/20  0615 11/03/20  0602   AST 20 31 38   ALT 61* 51* 49*   BILIDIR  --  <0.2 <0.2   BILITOT 1.1 0.3 0.4   ALKPHOS 109 89 88       PT/INR:   Recent Labs     11/02/20  0615 11/03/20  0602   PROTIME 11.2 11.8   INR 1.0 1.0       APTT:   Recent Labs     11/02/20  0615 11/03/20  0602   APTT 23.4* 23.4*       Fasting Lipid Panel:    Lab Results   Component Value Date    HDL 51 11/03/2020       Cardiac Enzymes:    Lab Results   Component Value Date    TROPONINI <0.01 11/02/2020    TROPONINI <0.01 11/01/2020    TROPONINI <0.01 11/01/2020       Notable Cultures:      Blood cultures   Blood Culture, Routine   Date Value Ref Range Status   11/02/2020 24 Hours no growth  Preliminary Respiratory cultures No results found for: RESPCULTURE No results found for: LABGRAM  Urine   Urine Culture, Routine   Date Value Ref Range Status   11/01/2020 Growth not present  Final     Legionella No results found for: LABLEGI  C Diff PCR No results found for: CDIFPCR  Wound culture/abscess: No results for input(s): WNDABS in the last 72 hours. Tip culture:No results for input(s): CXCATHTIP in the last 72 hours. Antibiotic  Days  Day started                                Oxygen:     Vent Information  SpO2: 96 %  Additional Respiratory  Assessments  Pulse: 89  Resp: 19  SpO2: 96 %  Oral Care: Mouth swabbed     Nasal cannula L/min     Face mask %     Reservoirs mask %       ABG   Vent Settings     PH   Mode      PCO2   TV      PO2   RR      HCO3   PS      Sat%   PEEP      FIO2   FIO2        P/F          Lines:  Site  Day  Date inserted     TLC              PICC              Arterial line              Peripheral line              HD cath            [REMOVED] Urethral Catheter Temperature probe 16 fr-Output (mL): 100 mL    Imaging Studies:      Resident's Assessment and Plan      ASSESSMENT:  1. DKA, new onset diabetes; HbA1c 11.2,  HCO3 15, anion gap 33, beta hydroxybutyrate > 4.5, pH 7.29  2. Hypovolemic hypernatremia, sodium 157 on admission, likely secondary to excessive diuresis secondary to hyperglycemia, fluid deficit of at least 12L without considering urine output, Current Na 157, FWD 6.2L  3. ANDRAE vs ANDRAE on CKD vs stable CKD, likely hemodynamically mediated volume responsive prerenal ANDRAE  4.  Elevated ALT 61, likely secondary to hypovolemia     PLAN:    · Bridged with 30U lantus and HDSS  · Continue D5 at 200cc/hr per nephro   · Closely monitor BG   · BMP q4hrs   Continue monitoring POCT q1 hrs x4 then switch to ACHS         # Peptic ulcer prophylaxis: diet  # DVT Prophylaxis: heparin   # Disposition: Transfer to Severo Moyer M.D., PGY-2    Attending Physician: Dr. Paul Gupta I

## 2020-11-03 NOTE — PROGRESS NOTES
P Quality Flow/Interdisciplinary Rounds Progress Note        Quality Flow Rounds held on November 3, 2020    Disciplines Attending:  Bedside Nurse, Nursing Unit Leadership and icu TEAM     Noy Jiménez was admitted on 11/1/2020 11:23 AM    Anticipated Discharge Date:  Expected Discharge Date: 11/08/20    Disposition:    Arnaud Score:  Arnaud Scale Score: 20    Readmission Risk              Risk of Unplanned Readmission:        12           Discussed patient goal for the day, patient clinical progression, and barriers to discharge. The following Goal(s) of the Day/Commitment(s) have been identified:  Continue current treatment, adjust insulin as indicated.        Cheikh Guzman  November 3, 2020

## 2020-11-03 NOTE — CARE COORDINATION
Chart reviewed, Na+ 162, will monitor and re-evaluate for intermediate transfer tomorrow; will notify [facility] d/t pt is self-pay.

## 2020-11-03 NOTE — PROGRESS NOTES
Department of Internal Medicine  Nephrology Attending Consult Note    Events reviewed. SUBJECTIVE: We are following Mr. Arturo Huynh for hypernatremia and ANDRAE. Reports no complaints, feeling better.      PHYSICAL EXAM:      Vitals:    VITALS:  BP (!) 125/97   Pulse 91   Temp 98.2 °F (36.8 °C) (Oral)   Resp 19   Ht 6' (1.829 m)   Wt 274 lb 14.4 oz (124.7 kg)   SpO2 94%   BMI 37.28 kg/m²   24HR INTAKE/OUTPUT:      Intake/Output Summary (Last 24 hours) at 11/3/2020 1346  Last data filed at 11/3/2020 1100  Gross per 24 hour   Intake 2899.91 ml   Output 1465 ml   Net 1434.91 ml       Constitutional: Patient is awake alert in no distress  HEENT: Pupils are equal reactive, mucous membranes are very dry  Respiratory: Lungs are clear  Cardiovascular/Edema: Heart sounds are regular rate and rhythm  Gastrointestinal: Abdomen is soft nontender  Neurologic: Nonfocal  Skin: No lesions no edema  Other: No edema    Scheduled Meds:   insulin glargine  35 Units Subcutaneous Daily    insulin lispro  0-18 Units Subcutaneous TID WC    insulin lispro  0-9 Units Subcutaneous Nightly    aspirin  81 mg Oral Daily    enoxaparin  40 mg Subcutaneous Daily    sodium chloride flush  10 mL Intravenous 2 times per day     Continuous Infusions:   dextrose 200 mL/hr at 11/03/20 0616    dextrose      [Held by provider] sodium chloride Stopped (11/02/20 2340)    dextrose 5 % and 0.45 % NaCl      insulin 11.34 Units/hr (11/03/20 1225)     PRN Meds:.hydrALAZINE, labetalol, glucose, glucagon (rDNA), dextrose, dextrose, potassium chloride, magnesium sulfate, sodium phosphate IVPB **OR** sodium phosphate IVPB **OR** sodium phosphate IVPB, dextrose 5 % and 0.45 % NaCl, dextrose, sodium chloride flush, acetaminophen **OR** acetaminophen, magnesium hydroxide, promethazine **OR** ondansetron    DATA:    CBC:   Lab Results   Component Value Date    WBC 11.7 11/03/2020    RBC 5.67 11/03/2020    HGB 16.4 11/03/2020    HCT 51.9 11/03/2020    MCV 91.5 11/03/2020    MCH 28.9 11/03/2020    MCHC 31.6 11/03/2020    RDW 14.2 11/03/2020     11/03/2020    MPV 10.7 11/03/2020     CMP:    Lab Results   Component Value Date     11/03/2020    K 4.0 11/03/2020     11/03/2020    CO2 24 11/03/2020    BUN 42 11/03/2020    CREATININE 1.6 11/03/2020    GFRAA 54 11/03/2020    LABGLOM 54 11/03/2020    GLUCOSE 264 11/03/2020    PROT 7.1 11/03/2020    LABALBU 3.9 11/03/2020    CALCIUM 9.3 11/03/2020    BILITOT 0.4 11/03/2020    ALKPHOS 88 11/03/2020    AST 38 11/03/2020    ALT 49 11/03/2020     Magnesium:    Lab Results   Component Value Date    MG 2.8 11/03/2020     Phosphorus:    Lab Results   Component Value Date    PHOS 3.0 11/03/2020      Urine albumin/creatinine: 18.8 mg/grams   Urine protein/creatinine: 0.2 g/grams      Radiology Review:      Chest x-ray November 1, 2020   1.  Slightly limited exam, grossly negative for acute process.                     BRIEF SUMMARY OF INITIAL CONSULT:    Briefly Mr. Nevaeh Meyer is a 48-year-old man with unremarkable past medical history, was a  and who presented to the ER reporting feeling quite fatigue and short of breath. After evaluation he was found to have a glucose >700 mg/dL, sodium 157 mEq/L, creatinine 1.8 mg/dL, reasons for this consultation. Patient reports that he has been feeling very thirsty, he is urinating all the time and he has had nocturia for the last 2 weeks. Denies any nausea vomiting or diarrhea. IMPRESSION/RECOMMENDATIONS:        1. ANDRAE stage I, volume responsive prerenal ANDRAE, secondary to urinary losses induced by osmotic diuresis (hyperglycemia). Renal function continues to slowly improve with IV fluid resuscitation. 2. Hypernatremia, corrected sodium for hyperglycemia 168 mEq/L, with profound dehydration secondary to urinary losses, osmotic diuresis. Sodium levels improve at adequate rate. To continue free water supplementation.   3. New onset DM, presented with DKA, glucose levels improved  4. DKA, ketones in urine, beta hydroxybutyrate >4.5, bicarbonate levels 15 mEq/L, anion gap improved as well as bicarbonate levels.     Plan:    · Continue D5W at 200 cc/hour  · Encourage p.o. water intake  · Continue to monitor renal function  · Continue to monitor sodium levels

## 2020-11-03 NOTE — PROGRESS NOTES
11/03/2020    LYMPHOPCT 11.3 11/03/2020    MONOPCT 7.9 11/03/2020    BASOPCT 0.1 11/03/2020    MONOSABS 0.92 11/03/2020    LYMPHSABS 1.32 11/03/2020    EOSABS 0.01 11/03/2020    BASOSABS 0.01 11/03/2020     CMP:    Lab Results   Component Value Date     11/03/2020    K 4.1 11/03/2020     11/03/2020    CO2 26 11/03/2020    BUN 39 11/03/2020    CREATININE 1.6 11/03/2020    GFRAA 54 11/03/2020    LABGLOM 54 11/03/2020    GLUCOSE 261 11/03/2020    PROT 7.1 11/03/2020    LABALBU 3.9 11/03/2020    CALCIUM 8.9 11/03/2020    BILITOT 0.4 11/03/2020    ALKPHOS 88 11/03/2020    AST 38 11/03/2020    ALT 49 11/03/2020       Resident's Assessment and Plan     Damián Crouch is a 53 y.o. male with a past medical history of prediabetes, who presented on 11/01/2020 with generalized fatigue, polyuria, polydipsia, and polyphagia for 3 to 4 days. 1.  DKA   -Newly diagnosed diabetic mellitus not on medication   -Afebrile, procalcitonin 0.27, WBC 11.7, CRP 2.8, urinalysis negative for UTI, urine culture and blood culture NGTD, urine Legionella antigen and strep pneumo antigen and MRSA screening negative   -Blood glucose 766, anion gap 33, bicarb 15, beta-hydroxybutyrate >4.5, urine ketone >80   -Insulin drip initiated   -Bridge to subcutaneous insulin when anion gap closed x 2 and patient can eat   -Follow POCT glucose AC/HS, BMP, Mg and Phos every 4 hours   -Follow Cx    2. Diabetes mellitus likely mixed type I and type II   -Hemoglobin A1c 11.2, not on medication   -Late onset diabetes mellitus   -Presented with DKA with blood glucose 766   -Follow JUNIE antibody,    -lipid panel unremarkable   -Diabetic education     3. ANDRAE vs ANDRAE on CKD vs stable CKD, likely hemodynamically mediated, FeUrea 23.2%   -Baseline creatinine level unknown   -Creatinine 1.8 on presentation   -Microalbumin/creatinine ratio 18.8   -Creatinine slowly improving    -Continue volume resuscitation   -Follow renal ultrasound    4.   Hypovolemic hypernatremia likely 2/2 excessive diuresis 2/2 hypoglycemia   -Sodium 157 on presentation, corrected sodium level 168   -Free water deficit 12.8 L   -Monitor sodium level   -Continue D5W at 200 cc/h per nephro   -Encourage p.o. free water intake   -Nephrology following    5. ?Hypertension   -/125 on presentation and >130/80 on multiple measures   -On hydralazine and labetalol as needed    6. Hypophosphatemia - resolved   -Replace as needed    PT/OT evaluation: Not indicated at this time  DVT prophylaxis/ GI prophylaxis: Lovenox/diet  Disposition: Continue inpatient management    Lang Alatorre MD, PGY-1  Attending physician: Dr. Pierre Matt  Department of Internal Medicine  Internal Medicine Residency / [address]    Attending Physician Statement    Martita Marie case was discussed, including pertinent history and examination findings with the multidisciplinary team during bedside rounds. I have seen and examined the patient and the key elements of the encounter have been performed by myself. I have read and reviewed the documentation. If needed or disputed the following findings, counseling and treatment options which have been corrected and communicated back to the patient, family if applicable and contributing physicians. I agree with the assessment, plan and orders as noted by the resident.       Elias Caceres DO, Lem Duhamel  Professor of Medicine  Pulmonary, [facility] Sleep Medicine  Internal Medicine Academic Faculty

## 2020-11-04 LAB
ALBUMIN SERPL-MCNC: 3.2 G/DL (ref 3.5–5.2)
ALP BLD-CCNC: 80 U/L (ref 40–129)
ALT SERPL-CCNC: 41 U/L (ref 0–40)
ANION GAP SERPL CALCULATED.3IONS-SCNC: 10 MMOL/L (ref 7–16)
ANION GAP SERPL CALCULATED.3IONS-SCNC: 12 MMOL/L (ref 7–16)
ANION GAP SERPL CALCULATED.3IONS-SCNC: 14 MMOL/L (ref 7–16)
ANION GAP SERPL CALCULATED.3IONS-SCNC: 5 MMOL/L (ref 7–16)
APTT: 23.8 SEC (ref 24.5–35.1)
AST SERPL-CCNC: 23 U/L (ref 0–39)
BASOPHILS ABSOLUTE: 0.01 E9/L (ref 0–0.2)
BASOPHILS RELATIVE PERCENT: 0.1 % (ref 0–2)
BILIRUB SERPL-MCNC: 0.5 MG/DL (ref 0–1.2)
BILIRUBIN DIRECT: <0.2 MG/DL (ref 0–0.3)
BILIRUBIN, INDIRECT: ABNORMAL MG/DL (ref 0–1)
BUN BLDV-MCNC: 32 MG/DL (ref 6–20)
BUN BLDV-MCNC: 33 MG/DL (ref 6–20)
BUN BLDV-MCNC: 34 MG/DL (ref 6–20)
BUN BLDV-MCNC: 35 MG/DL (ref 6–20)
CALCIUM SERPL-MCNC: 8.7 MG/DL (ref 8.6–10.2)
CALCIUM SERPL-MCNC: 8.8 MG/DL (ref 8.6–10.2)
CALCIUM SERPL-MCNC: 8.9 MG/DL (ref 8.6–10.2)
CALCIUM SERPL-MCNC: 9.3 MG/DL (ref 8.6–10.2)
CHLORIDE BLD-SCNC: 111 MMOL/L (ref 98–107)
CHLORIDE BLD-SCNC: 112 MMOL/L (ref 98–107)
CHLORIDE BLD-SCNC: 114 MMOL/L (ref 98–107)
CHLORIDE BLD-SCNC: 115 MMOL/L (ref 98–107)
CO2: 23 MMOL/L (ref 22–29)
CO2: 24 MMOL/L (ref 22–29)
CO2: 25 MMOL/L (ref 22–29)
CO2: 30 MMOL/L (ref 22–29)
CREAT SERPL-MCNC: 1.3 MG/DL (ref 0.7–1.2)
CREAT SERPL-MCNC: 1.4 MG/DL (ref 0.7–1.2)
CREAT SERPL-MCNC: 1.4 MG/DL (ref 0.7–1.2)
CREAT SERPL-MCNC: 1.5 MG/DL (ref 0.7–1.2)
EOSINOPHILS ABSOLUTE: 0.04 E9/L (ref 0.05–0.5)
EOSINOPHILS RELATIVE PERCENT: 0.6 % (ref 0–6)
GFR AFRICAN AMERICAN: 59
GFR AFRICAN AMERICAN: >60
GFR NON-AFRICAN AMERICAN: 59 ML/MIN/1.73
GFR NON-AFRICAN AMERICAN: >60 ML/MIN/1.73
GLUCOSE BLD-MCNC: 276 MG/DL (ref 74–99)
GLUCOSE BLD-MCNC: 285 MG/DL (ref 74–99)
GLUCOSE BLD-MCNC: 291 MG/DL (ref 74–99)
GLUCOSE BLD-MCNC: 309 MG/DL (ref 74–99)
HCT VFR BLD CALC: 44 % (ref 37–54)
HEMOGLOBIN: 14.2 G/DL (ref 12.5–16.5)
IMMATURE GRANULOCYTES #: 0.02 E9/L
IMMATURE GRANULOCYTES %: 0.3 % (ref 0–5)
INR BLD: 1.1
LYMPHOCYTES ABSOLUTE: 1.31 E9/L (ref 1.5–4)
LYMPHOCYTES RELATIVE PERCENT: 18.6 % (ref 20–42)
MAGNESIUM: 2.6 MG/DL (ref 1.6–2.6)
MAGNESIUM: 2.6 MG/DL (ref 1.6–2.6)
MAGNESIUM: 2.7 MG/DL (ref 1.6–2.6)
MAGNESIUM: 2.9 MG/DL (ref 1.6–2.6)
MCH RBC QN AUTO: 29.3 PG (ref 26–35)
MCHC RBC AUTO-ENTMCNC: 32.3 % (ref 32–34.5)
MCV RBC AUTO: 90.7 FL (ref 80–99.9)
METER GLUCOSE: 267 MG/DL (ref 74–99)
METER GLUCOSE: 275 MG/DL (ref 74–99)
METER GLUCOSE: 282 MG/DL (ref 74–99)
METER GLUCOSE: 368 MG/DL (ref 74–99)
MONOCYTES ABSOLUTE: 0.45 E9/L (ref 0.1–0.95)
MONOCYTES RELATIVE PERCENT: 6.4 % (ref 2–12)
NEUTROPHILS ABSOLUTE: 5.23 E9/L (ref 1.8–7.3)
NEUTROPHILS RELATIVE PERCENT: 74 % (ref 43–80)
PDW BLD-RTO: 14.1 FL (ref 11.5–15)
PHOSPHORUS: 3.1 MG/DL (ref 2.5–4.5)
PHOSPHORUS: 3.1 MG/DL (ref 2.5–4.5)
PHOSPHORUS: 3.9 MG/DL (ref 2.5–4.5)
PHOSPHORUS: 4.2 MG/DL (ref 2.5–4.5)
PLATELET # BLD: 127 E9/L (ref 130–450)
PMV BLD AUTO: 10.8 FL (ref 7–12)
POTASSIUM SERPL-SCNC: 4 MMOL/L (ref 3.5–5)
POTASSIUM SERPL-SCNC: 4.1 MMOL/L (ref 3.5–5)
POTASSIUM SERPL-SCNC: 4.3 MMOL/L (ref 3.5–5)
POTASSIUM SERPL-SCNC: 4.3 MMOL/L (ref 3.5–5)
PRO-BNP: <5 PG/ML (ref 0–125)
PROTHROMBIN TIME: 12.6 SEC (ref 9.3–12.4)
RBC # BLD: 4.85 E12/L (ref 3.8–5.8)
SODIUM BLD-SCNC: 145 MMOL/L (ref 132–146)
SODIUM BLD-SCNC: 147 MMOL/L (ref 132–146)
SODIUM BLD-SCNC: 151 MMOL/L (ref 132–146)
SODIUM BLD-SCNC: 152 MMOL/L (ref 132–146)
TOTAL PROTEIN: 6.2 G/DL (ref 6.4–8.3)
WBC # BLD: 7.1 E9/L (ref 4.5–11.5)

## 2020-11-04 PROCEDURE — 83880 ASSAY OF NATRIURETIC PEPTIDE: CPT

## 2020-11-04 PROCEDURE — 2500000003 HC RX 250 WO HCPCS: Performed by: INTERNAL MEDICINE

## 2020-11-04 PROCEDURE — 6360000002 HC RX W HCPCS: Performed by: INTERNAL MEDICINE

## 2020-11-04 PROCEDURE — 6370000000 HC RX 637 (ALT 250 FOR IP): Performed by: INTERNAL MEDICINE

## 2020-11-04 PROCEDURE — 82962 GLUCOSE BLOOD TEST: CPT

## 2020-11-04 PROCEDURE — 93005 ELECTROCARDIOGRAM TRACING: CPT | Performed by: INTERNAL MEDICINE

## 2020-11-04 PROCEDURE — 2580000003 HC RX 258: Performed by: INTERNAL MEDICINE

## 2020-11-04 PROCEDURE — 80048 BASIC METABOLIC PNL TOTAL CA: CPT

## 2020-11-04 PROCEDURE — 85610 PROTHROMBIN TIME: CPT

## 2020-11-04 PROCEDURE — 84100 ASSAY OF PHOSPHORUS: CPT

## 2020-11-04 PROCEDURE — 99233 SBSQ HOSP IP/OBS HIGH 50: CPT | Performed by: INTERNAL MEDICINE

## 2020-11-04 PROCEDURE — 85730 THROMBOPLASTIN TIME PARTIAL: CPT

## 2020-11-04 PROCEDURE — 36415 COLL VENOUS BLD VENIPUNCTURE: CPT

## 2020-11-04 PROCEDURE — 2060000000 HC ICU INTERMEDIATE R&B

## 2020-11-04 PROCEDURE — 80076 HEPATIC FUNCTION PANEL: CPT

## 2020-11-04 PROCEDURE — 85025 COMPLETE CBC W/AUTO DIFF WBC: CPT

## 2020-11-04 PROCEDURE — 83735 ASSAY OF MAGNESIUM: CPT

## 2020-11-04 RX ORDER — PANTOPRAZOLE SODIUM 40 MG/1
40 TABLET, DELAYED RELEASE ORAL
Status: DISCONTINUED | OUTPATIENT
Start: 2020-11-05 | End: 2020-11-09 | Stop reason: HOSPADM

## 2020-11-04 RX ORDER — INSULIN GLARGINE 100 [IU]/ML
40 INJECTION, SOLUTION SUBCUTANEOUS DAILY
Status: DISCONTINUED | OUTPATIENT
Start: 2020-11-04 | End: 2020-11-05

## 2020-11-04 RX ORDER — LISINOPRIL 5 MG/1
5 TABLET ORAL DAILY
Status: DISCONTINUED | OUTPATIENT
Start: 2020-11-04 | End: 2020-11-09 | Stop reason: HOSPADM

## 2020-11-04 RX ADMIN — HYDRALAZINE HYDROCHLORIDE 10 MG: 20 INJECTION, SOLUTION INTRAMUSCULAR; INTRAVENOUS at 20:45

## 2020-11-04 RX ADMIN — LABETALOL HYDROCHLORIDE 10 MG: 5 INJECTION, SOLUTION INTRAVENOUS at 00:56

## 2020-11-04 RX ADMIN — Medication 10 ML: at 08:33

## 2020-11-04 RX ADMIN — LISINOPRIL 5 MG: 5 TABLET ORAL at 18:32

## 2020-11-04 RX ADMIN — ASPIRIN 81 MG CHEWABLE TABLET 81 MG: 81 TABLET CHEWABLE at 08:32

## 2020-11-04 RX ADMIN — INSULIN LISPRO 9 UNITS: 100 INJECTION, SOLUTION INTRAVENOUS; SUBCUTANEOUS at 06:14

## 2020-11-04 RX ADMIN — ENOXAPARIN SODIUM 40 MG: 40 INJECTION SUBCUTANEOUS at 08:32

## 2020-11-04 RX ADMIN — INSULIN LISPRO 9 UNITS: 100 INJECTION, SOLUTION INTRAVENOUS; SUBCUTANEOUS at 16:58

## 2020-11-04 RX ADMIN — Medication 10 ML: at 23:35

## 2020-11-04 RX ADMIN — INSULIN GLARGINE 40 UNITS: 100 INJECTION, SOLUTION SUBCUTANEOUS at 08:33

## 2020-11-04 RX ADMIN — SODIUM CHLORIDE: 4.5 INJECTION, SOLUTION INTRAVENOUS at 03:03

## 2020-11-04 RX ADMIN — INSULIN LISPRO 5 UNITS: 100 INJECTION, SOLUTION INTRAVENOUS; SUBCUTANEOUS at 12:22

## 2020-11-04 RX ADMIN — INSULIN LISPRO 5 UNITS: 100 INJECTION, SOLUTION INTRAVENOUS; SUBCUTANEOUS at 08:34

## 2020-11-04 RX ADMIN — INSULIN LISPRO 15 UNITS: 100 INJECTION, SOLUTION INTRAVENOUS; SUBCUTANEOUS at 12:20

## 2020-11-04 RX ADMIN — INSULIN LISPRO 5 UNITS: 100 INJECTION, SOLUTION INTRAVENOUS; SUBCUTANEOUS at 16:59

## 2020-11-04 ASSESSMENT — PAIN SCALES - GENERAL
PAINLEVEL_OUTOF10: 0

## 2020-11-04 NOTE — CARE COORDINATION
Met with pt at bedside to discuss discharge / transition of care plan. Pt reports no DME, independent prior to arrival, up and walking in room without issues, plan is to return home by plane, truck is parked at [employer] station off [address], no PCP discussed need to obtain immediately upon his return home; would like meds to beds; provided phone number for independent taxi for transportation to airport.

## 2020-11-04 NOTE — PROGRESS NOTES
Department of Internal Medicine  Nephrology Attending Progress Note    Events reviewed. SUBJECTIVE: We are following Mr. Mila Kemp for hypernatremia and ANDRAE. Reports no complaints, feeling better.  tolerating oral fluids    PHYSICAL EXAM:      Vitals:    VITALS:  BP (!) 141/94   Pulse 77   Temp 98.2 °F (36.8 °C) (Oral)   Resp 16   Ht 6' (1.829 m)   Wt 272 lb 3 oz (123.5 kg)   SpO2 98%   BMI 36.92 kg/m²   24HR INTAKE/OUTPUT:      Intake/Output Summary (Last 24 hours) at 11/4/2020 1015  Last data filed at 11/4/2020 6797  Gross per 24 hour   Intake 4242.5 ml   Output 1300 ml   Net 2942.5 ml       Constitutional: Patient is awake alert in no distress  HEENT: Pupils are equal reactive, mucous membranes are very dry  Respiratory: Lungs are clear  Cardiovascular/Edema: Heart sounds are regular rate and rhythm  Gastrointestinal: Abdomen is soft nontender  Neurologic: Nonfocal  Skin: No lesions no edema  Other: No edema    Scheduled Meds:   insulin glargine  40 Units Subcutaneous Daily    insulin lispro  5 Units Subcutaneous TID WC    insulin lispro  0-18 Units Subcutaneous TID WC    aspirin  81 mg Oral Daily    enoxaparin  40 mg Subcutaneous Daily    sodium chloride flush  10 mL Intravenous 2 times per day     Continuous Infusions:   dextrose       PRN Meds:.hydrALAZINE, labetalol, glucose, glucagon (rDNA), dextrose, dextrose, dextrose, sodium chloride flush, acetaminophen **OR** acetaminophen, magnesium hydroxide, promethazine **OR** ondansetron    DATA:    CBC:   Lab Results   Component Value Date    WBC 7.1 11/04/2020    RBC 4.85 11/04/2020    HGB 14.2 11/04/2020    HCT 44.0 11/04/2020    MCV 90.7 11/04/2020    MCH 29.3 11/04/2020    MCHC 32.3 11/04/2020    RDW 14.1 11/04/2020     11/04/2020    MPV 10.8 11/04/2020     CMP:    Lab Results   Component Value Date     11/04/2020    K 4.3 11/04/2020     11/04/2020    CO2 25 11/04/2020    BUN 33 11/04/2020    CREATININE 1.4 11/04/2020    GFRAA >60 11/04/2020    LABGLOM >60 11/04/2020    GLUCOSE 291 11/04/2020    PROT 6.2 11/04/2020    LABALBU 3.2 11/04/2020    CALCIUM 8.8 11/04/2020    BILITOT 0.5 11/04/2020    ALKPHOS 80 11/04/2020    AST 23 11/04/2020    ALT 41 11/04/2020     Magnesium:    Lab Results   Component Value Date    MG 2.6 11/04/2020     Phosphorus:    Lab Results   Component Value Date    PHOS 4.2 11/04/2020      Urine albumin/creatinine: 18.8 mg/grams   Urine protein/creatinine: 0.2 g/grams      Radiology Review:      Chest x-ray November 1, 2020   1.  Slightly limited exam, grossly negative for acute process.                     BRIEF SUMMARY OF INITIAL CONSULT:    Briefly Mr. Nahomy Dyson is a 58-year-old man with unremarkable past medical history, was a  and who presented to the ER reporting feeling quite fatigue and short of breath. After evaluation he was found to have a glucose >700 mg/dL, sodium 157 mEq/L, creatinine 1.8 mg/dL, reasons for this consultation. Patient reports that he has been feeling very thirsty, he is urinating all the time and he has had nocturia for the last 2 weeks. Denies any nausea vomiting or diarrhea. IMPRESSION/RECOMMENDATIONS:        1. ANDRAE stage I, volume responsive prerenal ANDRAE, secondary to urinary losses induced by osmotic diuresis (hyperglycemia). Renal function continues to slowly improve with IV fluid resuscitation. 2. Hypernatremia, corrected sodium for hyperglycemia 168 mEq/L, with profound dehydration secondary to urinary losses, osmotic diuresis. Sodium levels improve at adequate rate. To continue free water supplementation. Sodium 151  Slowly improving today    3. New onset DM, presented with DKA, glucose levels improved  4. DKA, ketones in urine, beta hydroxybutyrate >4.5, bicarbonate levels 15 mEq/L, anion gap improved as well as bicarbonate levels.     Plan:    · Strict intake and output  · Encourage p.o. water intake  · Continue to monitor renal function  · Continue to monitor sodium levels    Campos Bonds MD

## 2020-11-04 NOTE — PROGRESS NOTES
Reason for consult:  New DM/DKA     A1C: 11.2%     [] Not available                 Patient states the following concerns/barriers to diabetes self-management:       [] None       [] Medication cost   [] Food cost/availability          [] Reading  [] Hearing   [] Vision                  [] Work    [] Transportation  [x] No insurance (due to kick in 11/12)    [] Physical limitations    [] Other:              Diabetes survival packet provided to:   [x] Patient     [] Other:    Information reviewed:   Definition of diabetes   Target glucose ranges/A1C   Self-monitoring of blood glucose   Prevention/symptoms/treatment of hypo-/hyperglycemia   Medication adherence   The plate method/meal planning guidelines   The benefits of exercise and recommendations   Reducing the risk of chronic complications          Patient states the following:     [x] He has a history of prediabetes and family history of type 2      [x] He had blurred vision, polyuria, polydipsia, lethargy X at least 1 week prior to admission               [x] He his a  from [facility] and does not have a PCP established--plans to get one when he returns home    [x] He usually only eats once a day and gets very little activity d/t his job        Comment:  Patient very pleasant and receptive to education. He is agreeable to starting a walking regimen daily and will try to eat three carb-controlled meals daily, every 4.5 to 5 hours. He does not have insurance until 11/12, but states he will call his supervisor to check on this. I provided him with a list of generic antidiabetes medications, insulins, and glucose meter that can be obtained at a low cost out-of-pocket at [facility] until he has his insurance coverage. I encouraged him to practice self-monitoring blood glucose and drawing up and self-injecting insulin when he is due for it, under nurse supervision.      Diabetes medications reviewed (use, purpose, action, side effects):  Education provided

## 2020-11-04 NOTE — PROGRESS NOTES
Messaged Team 1 (Dr. Kimber Boykin) via [facility] re: pt having 15 beats of VTach. Orders placed.

## 2020-11-05 LAB
ANION GAP SERPL CALCULATED.3IONS-SCNC: 11 MMOL/L (ref 7–16)
APTT: 24.8 SEC (ref 24.5–35.1)
BASOPHILS ABSOLUTE: 0.01 E9/L (ref 0–0.2)
BASOPHILS RELATIVE PERCENT: 0.2 % (ref 0–2)
BUN BLDV-MCNC: 26 MG/DL (ref 6–20)
CALCIUM SERPL-MCNC: 8.9 MG/DL (ref 8.6–10.2)
CHLORIDE BLD-SCNC: 113 MMOL/L (ref 98–107)
CO2: 23 MMOL/L (ref 22–29)
CREAT SERPL-MCNC: 1.2 MG/DL (ref 0.7–1.2)
EOSINOPHILS ABSOLUTE: 0.13 E9/L (ref 0.05–0.5)
EOSINOPHILS RELATIVE PERCENT: 2.4 % (ref 0–6)
GFR AFRICAN AMERICAN: >60
GFR NON-AFRICAN AMERICAN: >60 ML/MIN/1.73
GLUCOSE BLD-MCNC: 262 MG/DL (ref 74–99)
HCT VFR BLD CALC: 41.9 % (ref 37–54)
HEMOGLOBIN: 13.7 G/DL (ref 12.5–16.5)
IMMATURE GRANULOCYTES #: 0.02 E9/L
IMMATURE GRANULOCYTES %: 0.4 % (ref 0–5)
INR BLD: 1.1
LYMPHOCYTES ABSOLUTE: 1.05 E9/L (ref 1.5–4)
LYMPHOCYTES RELATIVE PERCENT: 19.1 % (ref 20–42)
MAGNESIUM: 2.6 MG/DL (ref 1.6–2.6)
MCH RBC QN AUTO: 29.1 PG (ref 26–35)
MCHC RBC AUTO-ENTMCNC: 32.7 % (ref 32–34.5)
MCV RBC AUTO: 89.1 FL (ref 80–99.9)
METER GLUCOSE: 224 MG/DL (ref 74–99)
METER GLUCOSE: 261 MG/DL (ref 74–99)
METER GLUCOSE: 283 MG/DL (ref 74–99)
METER GLUCOSE: 297 MG/DL (ref 74–99)
MONOCYTES ABSOLUTE: 0.49 E9/L (ref 0.1–0.95)
MONOCYTES RELATIVE PERCENT: 8.9 % (ref 2–12)
NEUTROPHILS ABSOLUTE: 3.8 E9/L (ref 1.8–7.3)
NEUTROPHILS RELATIVE PERCENT: 69 % (ref 43–80)
PDW BLD-RTO: 13.4 FL (ref 11.5–15)
PHOSPHORUS: 2.7 MG/DL (ref 2.5–4.5)
PLATELET # BLD: 108 E9/L (ref 130–450)
PMV BLD AUTO: 11.1 FL (ref 7–12)
POTASSIUM SERPL-SCNC: 4.1 MMOL/L (ref 3.5–5)
PROTHROMBIN TIME: 12.2 SEC (ref 9.3–12.4)
RBC # BLD: 4.7 E12/L (ref 3.8–5.8)
SODIUM BLD-SCNC: 147 MMOL/L (ref 132–146)
WBC # BLD: 5.5 E9/L (ref 4.5–11.5)

## 2020-11-05 PROCEDURE — 6370000000 HC RX 637 (ALT 250 FOR IP): Performed by: INTERNAL MEDICINE

## 2020-11-05 PROCEDURE — 99253 IP/OBS CNSLTJ NEW/EST LOW 45: CPT | Performed by: STUDENT IN AN ORGANIZED HEALTH CARE EDUCATION/TRAINING PROGRAM

## 2020-11-05 PROCEDURE — 82962 GLUCOSE BLOOD TEST: CPT

## 2020-11-05 PROCEDURE — 85610 PROTHROMBIN TIME: CPT

## 2020-11-05 PROCEDURE — 80048 BASIC METABOLIC PNL TOTAL CA: CPT

## 2020-11-05 PROCEDURE — 2580000003 HC RX 258: Performed by: INTERNAL MEDICINE

## 2020-11-05 PROCEDURE — 93005 ELECTROCARDIOGRAM TRACING: CPT | Performed by: INTERNAL MEDICINE

## 2020-11-05 PROCEDURE — 2060000000 HC ICU INTERMEDIATE R&B

## 2020-11-05 PROCEDURE — 85025 COMPLETE CBC W/AUTO DIFF WBC: CPT

## 2020-11-05 PROCEDURE — 99233 SBSQ HOSP IP/OBS HIGH 50: CPT | Performed by: INTERNAL MEDICINE

## 2020-11-05 PROCEDURE — 6360000002 HC RX W HCPCS: Performed by: INTERNAL MEDICINE

## 2020-11-05 PROCEDURE — 83735 ASSAY OF MAGNESIUM: CPT

## 2020-11-05 PROCEDURE — 36415 COLL VENOUS BLD VENIPUNCTURE: CPT

## 2020-11-05 PROCEDURE — 84100 ASSAY OF PHOSPHORUS: CPT

## 2020-11-05 PROCEDURE — 85730 THROMBOPLASTIN TIME PARTIAL: CPT

## 2020-11-05 RX ORDER — INSULIN GLARGINE 100 [IU]/ML
50 INJECTION, SOLUTION SUBCUTANEOUS DAILY
Status: DISCONTINUED | OUTPATIENT
Start: 2020-11-06 | End: 2020-11-06

## 2020-11-05 RX ADMIN — LISINOPRIL 5 MG: 5 TABLET ORAL at 08:46

## 2020-11-05 RX ADMIN — INSULIN LISPRO 10 UNITS: 100 INJECTION, SOLUTION INTRAVENOUS; SUBCUTANEOUS at 16:52

## 2020-11-05 RX ADMIN — INSULIN LISPRO 9 UNITS: 100 INJECTION, SOLUTION INTRAVENOUS; SUBCUTANEOUS at 16:50

## 2020-11-05 RX ADMIN — INSULIN GLARGINE 40 UNITS: 100 INJECTION, SOLUTION SUBCUTANEOUS at 08:48

## 2020-11-05 RX ADMIN — INSULIN LISPRO 6 UNITS: 100 INJECTION, SOLUTION INTRAVENOUS; SUBCUTANEOUS at 06:13

## 2020-11-05 RX ADMIN — INSULIN LISPRO 5 UNITS: 100 INJECTION, SOLUTION INTRAVENOUS; SUBCUTANEOUS at 06:14

## 2020-11-05 RX ADMIN — INSULIN LISPRO 9 UNITS: 100 INJECTION, SOLUTION INTRAVENOUS; SUBCUTANEOUS at 11:27

## 2020-11-05 RX ADMIN — PANTOPRAZOLE SODIUM 40 MG: 40 TABLET, DELAYED RELEASE ORAL at 06:13

## 2020-11-05 RX ADMIN — Medication 10 ML: at 20:05

## 2020-11-05 RX ADMIN — INSULIN LISPRO 10 UNITS: 100 INJECTION, SOLUTION INTRAVENOUS; SUBCUTANEOUS at 11:28

## 2020-11-05 RX ADMIN — Medication 10 ML: at 08:46

## 2020-11-05 RX ADMIN — ENOXAPARIN SODIUM 40 MG: 40 INJECTION SUBCUTANEOUS at 08:46

## 2020-11-05 RX ADMIN — ASPIRIN 81 MG CHEWABLE TABLET 81 MG: 81 TABLET CHEWABLE at 08:46

## 2020-11-05 ASSESSMENT — PAIN SCALES - GENERAL
PAINLEVEL_OUTOF10: 0

## 2020-11-05 NOTE — PROGRESS NOTES
Department of Internal Medicine  Nephrology Progress Note    Events reviewed. SUBJECTIVE: We are following Mr. Janee Jansen for hypernatremia and ANDRAE. Reports no complaints. States that he feels great.     PHYSICAL EXAM:      Vitals:    VITALS:  /80   Pulse 76   Temp 96.6 °F (35.9 °C) (Temporal)   Resp 18   Ht 6' (1.829 m)   Wt 271 lb 3.2 oz (123 kg)   SpO2 97%   BMI 36.78 kg/m²   24HR INTAKE/OUTPUT:      Intake/Output Summary (Last 24 hours) at 11/5/2020 1734  Last data filed at 11/5/2020 1652  Gross per 24 hour   Intake 840 ml   Output 2700 ml   Net -1860 ml       Constitutional: Patient is awake alert in no distress  HEENT: Pupils are equal reactive, mucous membranes moist  Respiratory: Lungs are clear  Cardiovascular/Edema: Heart sounds are regular rate and rhythm  Gastrointestinal: Abdomen is soft nontender  Neurologic: Nonfocal  Skin: No lesions no edema  Other: No edema    Scheduled Meds:   insulin lispro  10 Units Subcutaneous TID WC    [START ON 11/6/2020] insulin glargine  50 Units Subcutaneous Daily    lisinopril  5 mg Oral Daily    pantoprazole  40 mg Oral QAM AC    insulin lispro  0-18 Units Subcutaneous TID WC    aspirin  81 mg Oral Daily    enoxaparin  40 mg Subcutaneous Daily    sodium chloride flush  10 mL Intravenous 2 times per day     Continuous Infusions:   dextrose       PRN Meds:.perflutren lipid microspheres, hydrALAZINE, labetalol, glucose, glucagon (rDNA), dextrose, dextrose, dextrose, sodium chloride flush, acetaminophen **OR** acetaminophen, magnesium hydroxide, promethazine **OR** ondansetron    DATA:    CBC:   Lab Results   Component Value Date    WBC 5.5 11/05/2020    RBC 4.70 11/05/2020    HGB 13.7 11/05/2020    HCT 41.9 11/05/2020    MCV 89.1 11/05/2020    MCH 29.1 11/05/2020    MCHC 32.7 11/05/2020    RDW 13.4 11/05/2020     11/05/2020    MPV 11.1 11/05/2020     CMP:    Lab Results   Component Value Date     11/05/2020    K 4.1 11/05/2020     11/05/2020    CO2 23 11/05/2020    BUN 26 11/05/2020    CREATININE 1.2 11/05/2020    GFRAA >60 11/05/2020    LABGLOM >60 11/05/2020    GLUCOSE 262 11/05/2020    PROT 6.2 11/04/2020    LABALBU 3.2 11/04/2020    CALCIUM 8.9 11/05/2020    BILITOT 0.5 11/04/2020    ALKPHOS 80 11/04/2020    AST 23 11/04/2020    ALT 41 11/04/2020     Magnesium:    Lab Results   Component Value Date    MG 2.6 11/05/2020     Phosphorus:    Lab Results   Component Value Date    PHOS 2.7 11/05/2020      Urine albumin/creatinine: 18.8 mg/grams   Urine protein/creatinine: 0.2 g/grams      Radiology Review:      Chest x-ray November 1, 2020   1.  Slightly limited exam, grossly negative for acute process.                     BRIEF SUMMARY OF INITIAL CONSULT:    Briefly Mr. Curry Michelle is a 65-year-old man with unremarkable past medical history, was a  and who presented to the ER reporting feeling quite fatigue and short of breath. After evaluation he was found to have a glucose >700 mg/dL, sodium 157 mEq/L, creatinine 1.8 mg/dL, reasons for this consultation. Patient reports that he has been feeling very thirsty, he is urinating all the time and he has had nocturia for the last 2 weeks. Denies any nausea vomiting or diarrhea. Problems resolved:  DKA, ketones in urine, beta hydroxybutyrate >4.5, bicarbonate levels 15 mEq/L, anion gap improved as well as bicarbonate levels. IMPRESSION/RECOMMENDATIONS:        1. ANDRAE stage I, volume responsive prerenal ANDRAE, secondary to urinary losses induced by osmotic diuresis (hyperglycemia). Resolved. Creatinine 1.2 mg/dL today. 2. Hypernatremia, with profound dehydration secondary to urinary losses, osmotic diuresis. · Sodium levels continue to improve, corrected Na 151 mEq/L today. Encouraged to continue PO free water intake.      3. New onset DM, presented with DKA, glucose levels improved    Plan:    · Encourage p.o. water intake  · Continue to monitor renal function  · Continue to monitor sodium levels

## 2020-11-05 NOTE — PLAN OF CARE
Problem: Falls - Risk of:  Goal: Absence of physical injury  Description: Absence of physical injury  Outcome: Met This Shift     Problem: Serum Glucose Level - Abnormal:  Goal: Ability to maintain appropriate glucose levels will improve  Description: Ability to maintain appropriate glucose levels will improve  Outcome: Met This Shift

## 2020-11-05 NOTE — PLAN OF CARE
Problem: Falls - Risk of:  Goal: Will remain free from falls  Description: Will remain free from falls  Outcome: Met This Shift  Goal: Absence of physical injury  Description: Absence of physical injury  11/5/2020 0430 by Darrick Sharif RN  Outcome: Met This Shift     Problem: Serum Glucose Level - Abnormal:  Goal: Ability to maintain appropriate glucose levels will improve  Description: Ability to maintain appropriate glucose levels will improve  11/5/2020 1724 by Iram Hebert RN  Outcome: Met This Shift  11/5/2020 0430 by Darrick Sharif RN  Outcome: Met This Shift

## 2020-11-05 NOTE — CONSULTS
[address] DEPARTMENT/DIVISION OF CARDIOLOGY  Consultation Report  PATIENT: Jeanie [facility] RECORD NUMBER: [id number]  DATE OF SERVICE:  11/5/2020  ATTENDING ELECTROPHYSIOLOGIST: Timur Shields DO  REFERRING PHYSICIAN: No ref. provider found and No primary care provider on file. CHIEF COMPLAINT: fatigue, dizziness, blurred vision    HPI: Patient no medical history. He was managed by PCP with aspirin 81 mg daily. He presented with chief complaint of fatigue, blurred vision, dyspnea, sore throat, and dizziness. He was diagnosed with DKA, ANDRAE, hypernatremia, and HTN. On telemetry he had a ~7 second episode of NSVT. EP is consulted for evaluation and management. He denies any complaints at this time. Past Medical History:   Diagnosis Date    Prediabetes      History reviewed. No pertinent surgical history.     Family History   Problem Relation Age of Onset    Diabetes Mother    There is no family history of sudden cardiac arrest    Social History     Tobacco Use    Smoking status: Never Smoker    Smokeless tobacco: Never Used   Substance Use Topics    Alcohol use: Yes     Comment: socially       Current Facility-Administered Medications   Medication Dose Route Frequency Provider Last Rate Last Dose    insulin lispro (HUMALOG) injection vial 10 Units  10 Units Subcutaneous TID  Andres Deshpande MD        insulin glargine (LANTUS) injection vial 40 Units  40 Units Subcutaneous Daily Andres Deshpande MD   40 Units at 11/05/20 0848    perflutren lipid microspheres (DEFINITY) injection 1.65 mg  1.5 mL Intravenous ONCE PRN Andres Deshpande MD        lisinopril (PRINIVIL;ZESTRIL) tablet 5 mg  5 mg Oral Daily Andres Deshpande MD   5 mg at 11/05/20 0846    pantoprazole (PROTONIX) tablet 40 mg  40 mg Oral QAM AC Andres Deshpande MD   40 mg at 11/05/20 0613    insulin lispro (HUMALOG) injection vial 0-18 Units  0-18 Units Subcutaneous TID  Heather Currie MD   6 Units at 11/05/20 3883    hydrALAZINE (APRESOLINE) injection 10 mg  10 mg Intravenous Q4H PRN Heather Currie MD   10 mg at 11/04/20 2045    labetalol (NORMODYNE;TRANDATE) injection 10 mg  10 mg Intravenous Q4H PRN Heather Currie MD   10 mg at 11/04/20 0056    aspirin chewable tablet 81 mg  81 mg Oral Daily Cassandra Malloy MD   81 mg at 11/05/20 0846    glucose (GLUTOSE) 40 % oral gel 15 g  15 g Oral PRN Heather Currie MD        glucagon (rDNA) injection 1 mg  1 mg Intramuscular PRN Heather Currie MD        dextrose 5 % solution  100 mL/hr Intravenous PRN Heather Currie MD        dextrose 50 % IV solution  12.5 g Intravenous PRN Dileep Hilton MD        enoxaparin (LOVENOX) injection 40 mg  40 mg Subcutaneous Daily Dileep Hilton MD   40 mg at 11/05/20 0846    dextrose 50 % IV solution  12.5 g Intravenous PRN Héctor Tanner MD        sodium chloride flush 0.9 % injection 10 mL  10 mL Intravenous 2 times per day Héctor Tanner MD   10 mL at 11/05/20 0846    sodium chloride flush 0.9 % injection 10 mL  10 mL Intravenous PRN Héctor Tanner MD   10 mL at 11/02/20 0622    acetaminophen (TYLENOL) tablet 650 mg  650 mg Oral Q6H PRN Héctor Tanner MD        Or   [facility] acetaminophen (TYLENOL) suppository 650 mg  650 mg Rectal Q6H PRN Héctor Tanner MD        magnesium hydroxide (MILK OF MAGNESIA) 400 MG/5ML suspension 30 mL  30 mL Oral Daily PRN Héctor Tanner MD        promethazine (PHENERGAN) tablet 12.5 mg  12.5 mg Oral Q6H PRN Héctor Tanner MD        Or    ondansetron (ZOFRAN) injection 4 mg  4 mg Intravenous Q6H PRN Héctor Tanner MD            No Known Allergies    ROS:   Constitutional: Negative for fever, activity change and appetite change. HENT: Negative for epistaxis. Eyes: Negative for diploplia, blurred vision. Respiratory: Negative for cough, chest tightness, shortness of breath and wheezing.    Cardiovascular: pertinent positives in HPI  Gastrointestinal: Negative for abdominal pain and blood in stool. Genitourinary: Negative for hematuria and difficulty urinating. Musculoskeletal: Negative for myalgias and gait problem. Skin: Negative for color change and rash. Neurological: Negative for syncope and light-headedness. Psychiatric/Behavioral: Negative for confusion and agitation. The patient is not nervous/anxious. Heme: no bleeding disorders, no melena or hematochezia  All other review of systems are negative     PHYSICAL EXAM:  Vitals:    11/04/20 2045 11/04/20 2330 11/05/20 0641 11/05/20 0844   BP: (!) 178/90 138/88  110/70   Pulse: 89 85  81   Resp: 16 16  20   Temp: 97.5 °F (36.4 °C) 97.2 °F (36.2 °C)  97.5 °F (36.4 °C)   TempSrc: Temporal Temporal  Temporal   SpO2: 97% 96%  97%   Weight:   271 lb 3.2 oz (123 kg)    Height:       Limited exam due to COVID-19. Constitutional: Well-developed, no acute distress, well groomed, obese  Eyes: conjunctivae normal, no xanthelasma   Ears, Nose, Throat: oral mucosa moist, no cyanosis   Neck: supple, no JVD  CV: normal rate, regular rhythm,  no murmurs, rubs, or gallops. Lungs: normal respiratory effort without used of accessory muscles, no wheezes  Abdomen: nondistended   Extremities: no digital clubbing, no edema   Skin: warm, no rashes  Neuro/Psych: A&O x 3, normal mood and affect    Data:    Recent Labs     11/03/20  0602 11/04/20  0712 11/05/20  0619   WBC 11.7* 7.1 5.5   HGB 16.4 14.2 13.7   HCT 51.9 44.0 41.9    127* 108*     Recent Labs     11/04/20  1431 11/04/20  1634 11/05/20  0619    147* 147*   K 4.0 4.3 4.1   * 112* 113*   CO2 24 30* 23   BUN 32* 34* 26*   CREATININE 1.3* 1.4* 1.2   CALCIUM 8.7 9.3 8.9     Recent Labs     11/03/20  0602 11/04/20  0712 11/05/20  0619   INR 1.0 1.1 1.1     No results for input(s): TSH in the last 72 hours. Lab Results   Component Value Date    MG 2.6 11/05/2020     Telemetry: sinus rhythm with ~7 second episode of NSVT.   ECG (11/5/20): sinus

## 2020-11-05 NOTE — PROGRESS NOTES
[facility]  Internal Medicine Residency Program  Progress Note - House Team 1    Patient:  Martita Marie 53 y.o. male MRN: [id number]     Date of Service: 11/5/2020     CC: Fatigue and shortness of breath  Overnight events: no acute event overnight. Subjective     Patient seen and examined at bedside this AM.    Patient states that he is feeling well, no new complains. Denies palpitations, chest pain, SOB, nausea or vomiting. EP consulted for nonsustained V-tach. Objective     Physical Exam:  · Vitals: BP (!) 110/58   Pulse 92   Temp 96.7 °F (35.9 °C) (Temporal)   Resp 17   Ht 6' (1.829 m)   Wt 271 lb 3.2 oz (123 kg)   SpO2 97%   BMI 36.78 kg/m²     I & O - 24hr: I/O this shift:  In: 360 [P.O.:360]  · Out: -    · General Appearance: alert, appears stated age, cooperative and no distress  · HEENT:  Head: Normal, normocephalic, atraumatic. · Eye: Normal external eye, conjunctiva, lids cornea, RANDI. · Neck: no adenopathy, no JVD, supple, symmetrical, trachea midline and thyroid not enlarged, symmetric, no tenderness/mass/nodules  · Lung: clear to auscultation bilaterally  · Heart: regular rate and rhythm, S1, S2 normal, no murmur, click, rub or gallop  · Abdomen: soft, non-tender; bowel sounds normal; no masses,  no organomegaly  · Extremities:  extremities normal, atraumatic, no cyanosis or edema  · Musculokeletal: No joint swelling, no muscle tenderness. ROM normal in all joints of extremities.    · Neurologic: Mental status: Alert, oriented, thought content appropriate  Subject  Pertinent Labs & Imaging Studies   amy  CBC with Differential:    Lab Results   Component Value Date    WBC 5.5 11/05/2020    RBC 4.70 11/05/2020    HGB 13.7 11/05/2020    HCT 41.9 11/05/2020     11/05/2020    MCV 89.1 11/05/2020    MCH 29.1 11/05/2020    MCHC 32.7 11/05/2020    RDW 13.4 11/05/2020    LYMPHOPCT 19.1 11/05/2020    MONOPCT 8.9 11/05/2020    BASOPCT 0.2 11/05/2020    MONOSABS 0.49 unknown   -Creatinine 1.8 on presentation   -Microalbumin/creatinine ratio 18.8   -Continue to monitor renal function   -Continue volume resuscitation   -Follow renal ultrasound   -Creatinine 1.2 on 11/5    4. Hypovolemic hypernatremia likely 2/2 excessive diuresis 2/2 hypoglycemia - improving   -Sodium 157 on presentation, corrected sodium level 168   -Free water deficit 12.8 L   -Continue to monitor sodium level   -Continue D5W at 200 cc/h per nephro   -Encourage p.o. free water intake   -Nephrology following   -Continue to encourage p.o. water intake    5. Hypertension   -/125 on presentation and >130/80 on multiple measures   -On lisinopril 5 mg daily, hydralazine and labetalol as needed    6. Hypophosphatemia - resolved   -Replace as needed    7. Nonsustained V. Tach   -Sinus rhythm with ~7-second episode of NSVT   -Continue to monitor on telemetry   -Maintain K >4 and Mg >2   -EP consulted. Appreciate recs. Exercise stress echo    PT/OT evaluation: Not indicated at this time  DVT prophylaxis/ GI prophylaxis: Lovenox/diet  Disposition: Continue inpatient management    Analilia Roche MD, PGY-1  Attending physician: Dr. Mallorie Wise  Department of Internal Medicine  Internal Medicine Residency / [address]    Attending Physician Statement    Lamont Gavin case was discussed, including pertinent history and examination findings with the multidisciplinary team during bedside rounds. I have seen and examined the patient and the key elements of the encounter have been performed by myself. I have read and reviewed the documentation. If needed or disputed the following findings, counseling and treatment options which have been corrected and communicated back to the patient, family if applicable and contributing physicians. I agree with the assessment, plan and orders as noted by the resident.       [facility]  Pulmonary, Critical Care & Sleep Medicine  Internal Medicine Academic Faculty

## 2020-11-06 ENCOUNTER — APPOINTMENT (OUTPATIENT)
Dept: NUCLEAR MEDICINE | Age: 55
DRG: 420 | End: 2020-11-06
Payer: MEDICAID

## 2020-11-06 ENCOUNTER — APPOINTMENT (OUTPATIENT)
Dept: NON INVASIVE DIAGNOSTICS | Age: 55
DRG: 420 | End: 2020-11-06
Payer: MEDICAID

## 2020-11-06 PROBLEM — E87.0 HYPERNATREMIA: Status: ACTIVE | Noted: 2020-11-06

## 2020-11-06 PROBLEM — I47.29 NSVT (NONSUSTAINED VENTRICULAR TACHYCARDIA): Status: ACTIVE | Noted: 2020-11-06

## 2020-11-06 PROBLEM — I10 ESSENTIAL HYPERTENSION: Status: ACTIVE | Noted: 2020-11-06

## 2020-11-06 LAB
ANION GAP SERPL CALCULATED.3IONS-SCNC: 10 MMOL/L (ref 7–16)
BASOPHILS ABSOLUTE: 0.01 E9/L (ref 0–0.2)
BASOPHILS RELATIVE PERCENT: 0.2 % (ref 0–2)
BUN BLDV-MCNC: 24 MG/DL (ref 6–20)
CALCIUM SERPL-MCNC: 8.9 MG/DL (ref 8.6–10.2)
CHLORIDE BLD-SCNC: 113 MMOL/L (ref 98–107)
CO2: 28 MMOL/L (ref 22–29)
CREAT SERPL-MCNC: 1.4 MG/DL (ref 0.7–1.2)
EKG ATRIAL RATE: 82 BPM
EKG ATRIAL RATE: 88 BPM
EKG P AXIS: 152 DEGREES
EKG P AXIS: 32 DEGREES
EKG P-R INTERVAL: 154 MS
EKG P-R INTERVAL: 158 MS
EKG Q-T INTERVAL: 368 MS
EKG Q-T INTERVAL: 386 MS
EKG QRS DURATION: 112 MS
EKG QRS DURATION: 94 MS
EKG QTC CALCULATION (BAZETT): 445 MS
EKG QTC CALCULATION (BAZETT): 450 MS
EKG R AXIS: -10 DEGREES
EKG R AXIS: -166 DEGREES
EKG T AXIS: -12 DEGREES
EKG T AXIS: -167 DEGREES
EKG VENTRICULAR RATE: 82 BPM
EKG VENTRICULAR RATE: 88 BPM
EOSINOPHILS ABSOLUTE: 0.13 E9/L (ref 0.05–0.5)
EOSINOPHILS RELATIVE PERCENT: 2.4 % (ref 0–6)
GFR AFRICAN AMERICAN: >60
GFR NON-AFRICAN AMERICAN: >60 ML/MIN/1.73
GLUCOSE BLD-MCNC: 106 MG/DL (ref 74–99)
GLUTAMIC ACID DECARB AB: <5 IU/ML (ref 0–5)
HCT VFR BLD CALC: 42.8 % (ref 37–54)
HEMOGLOBIN: 13.5 G/DL (ref 12.5–16.5)
IMMATURE GRANULOCYTES #: 0.01 E9/L
IMMATURE GRANULOCYTES %: 0.2 % (ref 0–5)
LV EF: 63 %
LV EF: 66 %
LVEF MODALITY: NORMAL
LVEF MODALITY: NORMAL
LYMPHOCYTES ABSOLUTE: 1.01 E9/L (ref 1.5–4)
LYMPHOCYTES RELATIVE PERCENT: 18.5 % (ref 20–42)
MCH RBC QN AUTO: 28.7 PG (ref 26–35)
MCHC RBC AUTO-ENTMCNC: 31.5 % (ref 32–34.5)
MCV RBC AUTO: 90.9 FL (ref 80–99.9)
METER GLUCOSE: 339 MG/DL (ref 74–99)
METER GLUCOSE: 411 MG/DL (ref 74–99)
METER GLUCOSE: 90 MG/DL (ref 74–99)
MONOCYTES ABSOLUTE: 0.61 E9/L (ref 0.1–0.95)
MONOCYTES RELATIVE PERCENT: 11.2 % (ref 2–12)
NEUTROPHILS ABSOLUTE: 3.7 E9/L (ref 1.8–7.3)
NEUTROPHILS RELATIVE PERCENT: 67.5 % (ref 43–80)
PDW BLD-RTO: 13.5 FL (ref 11.5–15)
PLATELET # BLD: 120 E9/L (ref 130–450)
PMV BLD AUTO: 11.2 FL (ref 7–12)
POTASSIUM SERPL-SCNC: 3.8 MMOL/L (ref 3.5–5)
RBC # BLD: 4.71 E12/L (ref 3.8–5.8)
SODIUM BLD-SCNC: 151 MMOL/L (ref 132–146)
T4 FREE: 1.04 NG/DL (ref 0.93–1.7)
TSH SERPL DL<=0.05 MIU/L-ACNC: 0.92 UIU/ML (ref 0.27–4.2)
WBC # BLD: 5.5 E9/L (ref 4.5–11.5)

## 2020-11-06 PROCEDURE — 99233 SBSQ HOSP IP/OBS HIGH 50: CPT | Performed by: INTERNAL MEDICINE

## 2020-11-06 PROCEDURE — 2580000003 HC RX 258: Performed by: INTERNAL MEDICINE

## 2020-11-06 PROCEDURE — 93306 TTE W/DOPPLER COMPLETE: CPT

## 2020-11-06 PROCEDURE — 6370000000 HC RX 637 (ALT 250 FOR IP): Performed by: INTERNAL MEDICINE

## 2020-11-06 PROCEDURE — 93010 ELECTROCARDIOGRAM REPORT: CPT | Performed by: INTERNAL MEDICINE

## 2020-11-06 PROCEDURE — 84439 ASSAY OF FREE THYROXINE: CPT

## 2020-11-06 PROCEDURE — 93017 CV STRESS TEST TRACING ONLY: CPT

## 2020-11-06 PROCEDURE — 3430000000 HC RX DIAGNOSTIC RADIOPHARMACEUTICAL: Performed by: INTERNAL MEDICINE

## 2020-11-06 PROCEDURE — 93016 CV STRESS TEST SUPVJ ONLY: CPT | Performed by: INTERNAL MEDICINE

## 2020-11-06 PROCEDURE — A9500 TC99M SESTAMIBI: HCPCS | Performed by: INTERNAL MEDICINE

## 2020-11-06 PROCEDURE — 85025 COMPLETE CBC W/AUTO DIFF WBC: CPT

## 2020-11-06 PROCEDURE — 36415 COLL VENOUS BLD VENIPUNCTURE: CPT

## 2020-11-06 PROCEDURE — 78452 HT MUSCLE IMAGE SPECT MULT: CPT

## 2020-11-06 PROCEDURE — 6360000002 HC RX W HCPCS: Performed by: INTERNAL MEDICINE

## 2020-11-06 PROCEDURE — 93018 CV STRESS TEST I&R ONLY: CPT | Performed by: INTERNAL MEDICINE

## 2020-11-06 PROCEDURE — 80048 BASIC METABOLIC PNL TOTAL CA: CPT

## 2020-11-06 PROCEDURE — 2060000000 HC ICU INTERMEDIATE R&B

## 2020-11-06 PROCEDURE — 6360000004 HC RX CONTRAST MEDICATION: Performed by: STUDENT IN AN ORGANIZED HEALTH CARE EDUCATION/TRAINING PROGRAM

## 2020-11-06 PROCEDURE — 82962 GLUCOSE BLOOD TEST: CPT

## 2020-11-06 PROCEDURE — 78452 HT MUSCLE IMAGE SPECT MULT: CPT | Performed by: INTERNAL MEDICINE

## 2020-11-06 PROCEDURE — 84443 ASSAY THYROID STIM HORMONE: CPT

## 2020-11-06 RX ORDER — INSULIN GLARGINE 100 [IU]/ML
50 INJECTION, SOLUTION SUBCUTANEOUS NIGHTLY
Status: DISCONTINUED | OUTPATIENT
Start: 2020-11-07 | End: 2020-11-06

## 2020-11-06 RX ORDER — INSULIN GLARGINE 100 [IU]/ML
50 INJECTION, SOLUTION SUBCUTANEOUS NIGHTLY
Status: DISCONTINUED | OUTPATIENT
Start: 2020-11-06 | End: 2020-11-09 | Stop reason: HOSPADM

## 2020-11-06 RX ORDER — SODIUM CHLORIDE 450 MG/100ML
INJECTION, SOLUTION INTRAVENOUS CONTINUOUS
Status: DISCONTINUED | OUTPATIENT
Start: 2020-11-06 | End: 2020-11-07

## 2020-11-06 RX ADMIN — INSULIN LISPRO 10 UNITS: 100 INJECTION, SOLUTION INTRAVENOUS; SUBCUTANEOUS at 16:43

## 2020-11-06 RX ADMIN — ENOXAPARIN SODIUM 40 MG: 40 INJECTION SUBCUTANEOUS at 10:04

## 2020-11-06 RX ADMIN — Medication 10 ML: at 10:05

## 2020-11-06 RX ADMIN — LISINOPRIL 5 MG: 5 TABLET ORAL at 10:04

## 2020-11-06 RX ADMIN — ASPIRIN 81 MG CHEWABLE TABLET 81 MG: 81 TABLET CHEWABLE at 10:04

## 2020-11-06 RX ADMIN — PANTOPRAZOLE SODIUM 40 MG: 40 TABLET, DELAYED RELEASE ORAL at 06:07

## 2020-11-06 RX ADMIN — Medication 11 MILLICURIE: at 12:52

## 2020-11-06 RX ADMIN — SODIUM CHLORIDE: 4.5 INJECTION, SOLUTION INTRAVENOUS at 16:27

## 2020-11-06 RX ADMIN — INSULIN GLARGINE 50 UNITS: 100 INJECTION, SOLUTION SUBCUTANEOUS at 22:08

## 2020-11-06 RX ADMIN — INSULIN LISPRO 18 UNITS: 100 INJECTION, SOLUTION INTRAVENOUS; SUBCUTANEOUS at 16:42

## 2020-11-06 RX ADMIN — Medication 30 MILLICURIE: at 14:10

## 2020-11-06 RX ADMIN — PERFLUTREN 1.65 MG: 6.52 INJECTION, SUSPENSION INTRAVENOUS at 12:11

## 2020-11-06 ASSESSMENT — PAIN SCALES - GENERAL: PAINLEVEL_OUTOF10: 0

## 2020-11-06 NOTE — PROGRESS NOTES
Department of Internal Medicine  Nephrology Progress Note    Events reviewed. SUBJECTIVE: We are following Mr. Haskins Pac for hypernatremia and ANDRAE. Reports no complaints.      PHYSICAL EXAM:      Vitals:    VITALS:  /79   Pulse 84   Temp 96.4 °F (35.8 °C) (Temporal)   Resp 20   Ht 6' (1.829 m)   Wt 269 lb 4.8 oz (122.2 kg)   SpO2 97%   BMI 36.52 kg/m²   24HR INTAKE/OUTPUT:      Intake/Output Summary (Last 24 hours) at 11/6/2020 1506  Last data filed at 11/6/2020 0608  Gross per 24 hour   Intake 480 ml   Output 1275 ml   Net -795 ml       Constitutional: Patient is awake alert in no distress  HEENT: Pupils are equal reactive, mucous membranes moist  Respiratory: Lungs are clear  Cardiovascular/Edema: Heart sounds are regular rate and rhythm  Gastrointestinal: Abdomen is soft nontender  Neurologic: Nonfocal  Skin: No lesions no edema  Other: No edema    Scheduled Meds:   insulin lispro  10 Units Subcutaneous TID WC    insulin glargine  50 Units Subcutaneous Daily    lisinopril  5 mg Oral Daily    pantoprazole  40 mg Oral QAM AC    insulin lispro  0-18 Units Subcutaneous TID WC    aspirin  81 mg Oral Daily    enoxaparin  40 mg Subcutaneous Daily    sodium chloride flush  10 mL Intravenous 2 times per day     Continuous Infusions:   sodium chloride      dextrose       PRN Meds:.technetium sestamibi, hydrALAZINE, labetalol, glucose, glucagon (rDNA), dextrose, dextrose, dextrose, sodium chloride flush, acetaminophen **OR** acetaminophen, magnesium hydroxide, promethazine **OR** ondansetron    DATA:    CBC:   Lab Results   Component Value Date    WBC 5.5 11/06/2020    RBC 4.71 11/06/2020    HGB 13.5 11/06/2020    HCT 42.8 11/06/2020    MCV 90.9 11/06/2020    MCH 28.7 11/06/2020    MCHC 31.5 11/06/2020    RDW 13.5 11/06/2020     11/06/2020    MPV 11.2 11/06/2020     CMP:    Lab Results   Component Value Date     11/06/2020    K 3.8 11/06/2020     11/06/2020    CO2 28 11/06/2020 BUN 24 11/06/2020    CREATININE 1.4 11/06/2020    GFRAA >60 11/06/2020    LABGLOM >60 11/06/2020    GLUCOSE 106 11/06/2020    PROT 6.2 11/04/2020    LABALBU 3.2 11/04/2020    CALCIUM 8.9 11/06/2020    BILITOT 0.5 11/04/2020    ALKPHOS 80 11/04/2020    AST 23 11/04/2020    ALT 41 11/04/2020     Magnesium:    Lab Results   Component Value Date    MG 2.6 11/05/2020     Phosphorus:    Lab Results   Component Value Date    PHOS 2.7 11/05/2020      Urine albumin/creatinine: 18.8 mg/grams   Urine protein/creatinine: 0.2 g/grams      Radiology Review:      Chest x-ray November 1, 2020   1.  Slightly limited exam, grossly negative for acute process.                     BRIEF SUMMARY OF INITIAL CONSULT:    Briefly Mr. Jazmyn Iqbal is a 53-year-old man with unremarkable past medical history, was a  and who presented to the ER reporting feeling quite fatigue and short of breath. After evaluation he was found to have a glucose >700 mg/dL, sodium 157 mEq/L, creatinine 1.8 mg/dL, reasons for this consultation. Patient reports that he has been feeling very thirsty, he is urinating all the time and he has had nocturia for the last 2 weeks. Denies any nausea vomiting or diarrhea. Problems resolved:  DKA, ketones in urine, beta hydroxybutyrate >4.5, bicarbonate levels 15 mEq/L, anion gap improved as well as bicarbonate levels. IMPRESSION/RECOMMENDATIONS:        1. ANDRAE stage I, volume responsive prerenal ANDRAE, secondary to urinary losses induced by osmotic diuresis (hyperglycemia). · Creatinine 1.4 mg/dL today with adequate urine output. 2. Hypernatremia, with profound dehydration secondary to urinary losses, osmotic diuresis. · Sodium 151 mmol/L today. To start scheduled PO free water.     3. New onset DM, presented with DKA, glucose levels improved    Plan:    · Patient to drink 250 mL water every hour while awake  · Continue to monitor renal function  · Continue to monitor sodium levels

## 2020-11-06 NOTE — PROCEDURES
Following placement of an intravenous catheter 10 millicuries of technetium 99m sestamibi was administered with resting SPECT images obtained approximately 45 minutes post injection. After completion of resting images, the patient exercised for 4:30 minutes on an accelerated Ted protocol treadmill achieving 8.5 METS of activity and 90% MPHR. No anginal chest pain or cardiac arrhythmias were noted. A normal blood pressure response to exercise was recorded. No electrocardiographic changes were noted. One mintue prior to the completion of exercise 30 millicuries of technetium 99m sestamibi was injected with post stress SPECT images obtained approximately 30 minutes post stress. Perfusion images pending.

## 2020-11-06 NOTE — PROGRESS NOTES
Stress Test  = Normal The myocardial perfusion imaging was normal.     Overall left ventricular systolic function was normal with no regional wall motion abnormalities. Low risk exercise myocardial perfusion imaging study.

## 2020-11-06 NOTE — PROGRESS NOTES
Intake Outcomes:  Diet Advancement/Tolerance  Physical Signs/Symptoms Outcomes:  Biochemical Data, Hemodynamic Status, Nutrition Focused Physical Findings, GI Status, Fluid Status or Edema, Weight, Skin     Discharge Planning:     Too soon to determine     Electronically signed by Luciana Ny RD, ZOILA on 11/6/20 at 11:53 AM EST    Contact: [phone]

## 2020-11-06 NOTE — PROGRESS NOTES
Cardiac Electrophysiology Inpatient Progress Note    Marian Mason  1965  Date of Service: 11/6/2020  PCP: No primary care provider on file. Electrophysiologist: Ray Harp DO        Subjective: Marian Mason is seen in hospital follow-up and management of:  NSVT in the setting of DKA    Marian Mason is seen in hospital follow-up, he continues in Sinus without recurrent NSVT and is without cardiac complaints he awaits a stress test and echo today. Patient Active Problem List   Diagnosis    DKA, type 2, not at goal [facility])    Hyperglycemia    ANDRAE [facility]acute [facility] injury) ([facility] [address]    Essential hypertension    Hypernatremia    NSVT (nonsustained ventricular tachycardia) ([facility])         Scheduled Medications:   insulin lispro  10 Units Subcutaneous TID WC    insulin glargine  50 Units Subcutaneous Daily    lisinopril  5 mg Oral Daily    pantoprazole  40 mg Oral QAM AC    insulin lispro  0-18 Units Subcutaneous TID WC    aspirin  81 mg Oral Daily    enoxaparin  40 mg Subcutaneous Daily    sodium chloride flush  10 mL Intravenous 2 times per day       Infusion Medications:   sodium chloride      dextrose         PRN Medications:  technetium sestamibi, hydrALAZINE, labetalol, glucose, glucagon (rDNA), dextrose, dextrose, dextrose, sodium chloride flush, acetaminophen **OR** acetaminophen, magnesium hydroxide, promethazine **OR** ondansetron    No Known Allergies    Wt Readings from Last 3 Encounters:   11/06/20 269 lb 4.8 oz (122.2 kg)     Temp Readings from Last 3 Encounters:   11/06/20 96.4 °F (35.8 °C) (Temporal)     BP Readings from Last 3 Encounters:   11/06/20 125/79     Pulse Readings from Last 3 Encounters:   11/06/20 84         Intake/Output Summary (Last 24 hours) at 11/6/2020 1347  Last data filed at 11/6/2020 0608  Gross per 24 hour   Intake 480 ml   Output 1275 ml   Net -795 ml       ROS:   Constitutional: Negative for fever, activity change and appetite change.    HENT: Negative for epistaxis. Eyes: Negative for diploplia, blurred vision. Respiratory: Negative for cough, chest tightness, shortness of breath and wheezing. Cardiovascular: No chest pain, palpitation, syncope, near syncope or orthopnea/PND  Gastrointestinal: Negative for abdominal pain and blood in stool. Genitourinary: Negative for hematuria and difficulty urinating. Musculoskeletal: Negative for myalgias and gait problem. Skin: Negative for color change and rash. Neurological: Negative for syncope and light-headedness. Psychiatric/Behavioral: Negative for confusion and agitation. The patient is not nervous/anxious. Heme: no bleeding disorders, no melena or hematochezia  All other review of systems are negative     PHYSICAL EXAM:  Vitals:    11/05/20 2330 11/06/20 0608 11/06/20 0956 11/06/20 1146   BP: 131/78  125/79    Pulse: 68  84    Resp: 16  20    Temp: 97.4 °F (36.3 °C)  96.4 °F (35.8 °C)    TempSrc: Temporal  Temporal    SpO2: 97%  97%    Weight:  269 lb 4.8 oz (122.2 kg)     Height:    6' (1.829 m)     Limited exam due to COVID-19. Constitutional: Well-developed, no acute distress, well groomed, over weight   Eyes: conjunctivae normal, no xanthelasma   Ears, Nose, Throat: oral mucosa moist, no cyanosis   Neck: supple, no JVD  CV: normal rate, regular rhythm,  no murmurs, rubs, or gallops.   Lungs: normal respiratory effort without used of accessory muscles, no wheezes  Abdomen: nondistended   Extremities: no digital clubbing, no edema   Skin: warm, no rashes  Neuro/Psych: A&O x 3, normal mood and affect    Pertinent Labs:  CBC:   Recent Labs     11/04/20  0712 11/05/20  0619 11/06/20  0617   WBC 7.1 5.5 5.5   HGB 14.2 13.7 13.5   HCT 44.0 41.9 42.8   * 108* 120*     BMP:  Recent Labs     11/04/20  1634 11/05/20  0619 11/06/20  0617   * 147* 151*   K 4.3 4.1 3.8   * 113* 113*   CO2 30* 23 28   BUN 34* 26* 24*   CREATININE 1.4* 1.2 1.4*   CALCIUM 9.3 8.9 8.9     ABGs: No results found for: PHART, PO2ART, [id number]  INR:   Recent Labs     11/04/20  0712 11/05/20  0619   INR 1.1 1.1     BNP: No results for input(s): BNP in the last 72 hours. TSH: No results found for: TSH   Cardiac Injury Profile: No results for input(s): CKTOTAL, CKMB, CKMBINDEX, TROPONINI in the last 72 hours. Lipid Profile:   Lab Results   Component Value Date    HDL 51 11/03/2020    LDLCALC 94 11/03/2020      Hemoglobin A1C: No components found for: HGBA1C   Xray: Ct Head Wo Contrast    Result Date: 11/1/2020  EXAMINATION: CT OF THE HEAD WITHOUT CONTRAST  11/1/2020 1:42 pm TECHNIQUE: CT of the head was performed without the administration of intravenous contrast. Dose modulation, iterative reconstruction, and/or weight based adjustment of the mA/kV was utilized to reduce the radiation dose to as low as reasonably achievable. COMPARISON: None. HISTORY: ORDERING SYSTEM PROVIDED HISTORY: blurred vision, dizziness TECHNOLOGIST PROVIDED HISTORY: Reason for exam:->blurred vision, dizziness Has a \"code stroke\" or \"stroke alert\" been called? ->No What reading provider will be dictating this exam?->CRC FINDINGS: BRAIN/VENTRICLES: No mass effect, edema or hemorrhage is seen. The brain is normal in volume. Mild chronic microvascular ischemic changes are noted. ORBITS: The visualized portion of the orbits demonstrate no acute abnormality. SINUSES: The visualized paranasal sinuses and mastoid air cells demonstrate no acute abnormality. SOFT TISSUES/SKULL:  No acute abnormality of the visualized skull or soft tissues. No acute intracranial abnormality. Xr Chest Portable    Result Date: 11/1/2020  EXAMINATION: ONE XRAY VIEW OF THE CHEST 11/1/2020 12:17 pm COMPARISON: None available to me at the time of this interpretation.  HISTORY: ORDERING SYSTEM PROVIDED HISTORY: shortness of breath TECHNOLOGIST PROVIDED HISTORY: Reason for exam:->shortness of breath What reading provider will be dictating this exam?->CRC FINDINGS: Multiple cardiac monitoring leads overlie the patient's chest. Apparent borderline cardiomegaly and top-normal central pulmonary vascular fullness are likely artifactually-accentuated by moderate bilateral pulmonary hypoinflation. Both lungs are otherwise grossly clear. Minimal to mild scattered osseous degenerative disease is most notable at the right acromioclavicular joint, where there is slight chronic fragmentation. No other plain radiographic abnormalities are noted. .     1. Slightly limited exam, grossly negative for acute process. .         Pertinent Cardiac Testing:    Echo: Pending     Lexiscan stress: pending       [id number]/6/2020: NSR    ECG (11/5/20): sinus rhythm at 88 bpm, LVH, and QTc = 445 msec.}    I have independently reviewed all of the ECGs and rhythm strips per above. I have personally reviewed the laboratory, cardiac diagnostic and radiographic testing as outlined above. I have reviewed previous records noted in [address]. Impression:     1. NSVT  -In setting of DKA. -Continue to monitor on telemetry. - Await exercise stress and echo. If negative ok for discharge from EP perspective. -Maintain K+ > 4 and Mg++ > 2. Thank you for allowing me to participate in your patient's care.     Discussed with Dr. Golden Hart   Electronically signed by ARAM Cat CNP on 11/6/2020 at 1:57 PM   [facility]    Attending Supervising Physicians ST[address]  I was present with the nurse practitioner during the history and exam. I discussed the findings and plans with the nurse practitioner and agree as documented in his note     Electronically signed by Mart Wong DO on 11/6/20 at 2:20 PM EST

## 2020-11-06 NOTE — PLAN OF CARE
Problem: Falls - Risk of:  Goal: Will remain free from falls  Description: Will remain free from falls  11/6/2020 0413 by Erin Terry RN  Outcome: Met This Shift     Problem: Falls - Risk of:  Goal: Absence of physical injury  Description: Absence of physical injury  Outcome: Met This Shift     Problem: Serum Glucose Level - Abnormal:  Goal: Ability to maintain appropriate glucose levels will improve  Description: Ability to maintain appropriate glucose levels will improve  11/6/2020 0413 by Erin Terry RN  Outcome: Met This Shift

## 2020-11-06 NOTE — PROGRESS NOTES
RDW 13.5 11/06/2020    LYMPHOPCT 18.5 11/06/2020    MONOPCT 11.2 11/06/2020    BASOPCT 0.2 11/06/2020    MONOSABS 0.61 11/06/2020    LYMPHSABS 1.01 11/06/2020    EOSABS 0.13 11/06/2020    BASOSABS 0.01 11/06/2020     CMP:    Lab Results   Component Value Date     11/06/2020    K 3.8 11/06/2020     11/06/2020    CO2 28 11/06/2020    BUN 24 11/06/2020    CREATININE 1.4 11/06/2020    GFRAA >60 11/06/2020    LABGLOM >60 11/06/2020    GLUCOSE 106 11/06/2020    PROT 6.2 11/04/2020    LABALBU 3.2 11/04/2020    CALCIUM 8.9 11/06/2020    BILITOT 0.5 11/04/2020    ALKPHOS 80 11/04/2020    AST 23 11/04/2020    ALT 41 11/04/2020       Resident's Assessment and Plan     Isiah Dc is a 53 y.o. male with a past medical history of prediabetes, who presented on 11/01/2020 with generalized fatigue, polyuria, polydipsia, and polyphagia for 3 to 4 days. 1.  DKA - resolved   -Newly diagnosed diabetic mellitus not on medication   -Afebrile, procalcitonin 0.27, WBC 11.7, CRP 2.8, urinalysis negative for UTI, urine culture and blood culture NGTD, urine Legionella antigen and strep pneumo antigen and MRSA screening negative   -Blood glucose 766, anion gap 33, bicarb 15, beta-hydroxybutyrate >4.5, urine ketone >80   -Insulin drip initiated   -Bridge to subcutaneous insulin when anion gap closed x 2 and patient can eat   -Follow POCT glucose AC/HS, BMP, Mg and Phos every 4 hours   -Urine, blood, MRSA cx negative, urine Legionella antigen and strep pneumo antigen negative   -Anion gap closed x2, CO2 >20, bridged to subcutaneous insulin    2.   Diabetes mellitus likely mixed type I and type II   -Hemoglobin A1c 11.2, not on medication   -Late onset diabetes mellitus   -Presented with DKA with blood glucose 766   -Follow JUNIE antibody,    -lipid panel unremarkable   -Diabetic education   -Increase Lantus from 35 to 40 to 50 units, premeal from 5 to 10 units and high-dose SS   -Carb controlled diet   -POCT glucose 4 times daily     3. ANDRAE vs ANDRAE on CKD vs stable CKD, likely hemodynamically mediated, FeUrea 23.2% -improving   -Baseline creatinine level unknown   -Creatinine 1.8 on presentation   -Microalbumin/creatinine ratio 18.8   -Continue to monitor renal function   -Continue volume resuscitation   -Follow renal ultrasound   -Creatinine 1.2 on 11/5    -Creatinine 1.4 on 11/6, start IVF    4. Hypovolemic hypernatremia likely 2/2 excessive diuresis 2/2 hypoglycemia - improving   -Sodium 157 on presentation, corrected sodium level 168   -Free water deficit 12.8 L   -Continue to monitor sodium level   -Continue D5W at 200 cc/h per nephro   -Encourage p.o. free water intake   -Nephrology following   -Continue to encourage p.o. water intake   -Sodium level initially trending down slowly and then trending up to 151, resume 1/2 normal saline    5. Hypertension   -/125 on presentation and >130/80 on multiple measures   -On lisinopril 5 mg daily, hydralazine and labetalol as needed    6. Hypophosphatemia - resolved   -Replace as needed    7. Nonsustained V. Tach   -Sinus rhythm with ~7-second episode of NSVT   -Continue to monitor on telemetry   -Maintain K >4 and Mg >2   -EP consulted. Appreciate recs. Exercise stress echo   -Stress test with no EKG change, myocardial perfusion imaging normal.  Low risk exercise myocardial perfusion imaging study. PT/OT evaluation: Not indicated at this time  DVT prophylaxis/ GI prophylaxis: Lovenox/diet  Disposition: Continue inpatient management    Hank Eldridge MD, PGY-1  Attending physician: Dr. Rosales Kincaid  Department of Internal Medicine  [facility] Residency [facility]    Attending Physician Statement    Starlette Marrow case was discussed, including pertinent history and examination findings with the multidisciplinary team during bedside rounds.   I have seen and examined the patient and the key elements of the encounter have been performed by

## 2020-11-07 LAB
ANION GAP SERPL CALCULATED.3IONS-SCNC: 8 MMOL/L (ref 7–16)
BASOPHILS ABSOLUTE: 0.01 E9/L (ref 0–0.2)
BASOPHILS RELATIVE PERCENT: 0.2 % (ref 0–2)
BLOOD CULTURE, ROUTINE: NORMAL
BUN BLDV-MCNC: 21 MG/DL (ref 6–20)
CALCIUM SERPL-MCNC: 8.8 MG/DL (ref 8.6–10.2)
CHLORIDE BLD-SCNC: 112 MMOL/L (ref 98–107)
CO2: 25 MMOL/L (ref 22–29)
CREAT SERPL-MCNC: 1.2 MG/DL (ref 0.7–1.2)
CULTURE, BLOOD 2: NORMAL
EOSINOPHILS ABSOLUTE: 0.14 E9/L (ref 0.05–0.5)
EOSINOPHILS RELATIVE PERCENT: 2.7 % (ref 0–6)
GFR AFRICAN AMERICAN: >60
GFR NON-AFRICAN AMERICAN: >60 ML/MIN/1.73
GLUCOSE BLD-MCNC: 174 MG/DL (ref 74–99)
HCT VFR BLD CALC: 39 % (ref 37–54)
HEMOGLOBIN: 12.7 G/DL (ref 12.5–16.5)
IMMATURE GRANULOCYTES #: 0.02 E9/L
IMMATURE GRANULOCYTES %: 0.4 % (ref 0–5)
LYMPHOCYTES ABSOLUTE: 1.09 E9/L (ref 1.5–4)
LYMPHOCYTES RELATIVE PERCENT: 21 % (ref 20–42)
MCH RBC QN AUTO: 28.9 PG (ref 26–35)
MCHC RBC AUTO-ENTMCNC: 32.6 % (ref 32–34.5)
MCV RBC AUTO: 88.8 FL (ref 80–99.9)
METER GLUCOSE: 115 MG/DL (ref 74–99)
METER GLUCOSE: 159 MG/DL (ref 74–99)
METER GLUCOSE: 204 MG/DL (ref 74–99)
METER GLUCOSE: 221 MG/DL (ref 74–99)
MONOCYTES ABSOLUTE: 0.6 E9/L (ref 0.1–0.95)
MONOCYTES RELATIVE PERCENT: 11.5 % (ref 2–12)
NEUTROPHILS ABSOLUTE: 3.34 E9/L (ref 1.8–7.3)
NEUTROPHILS RELATIVE PERCENT: 64.2 % (ref 43–80)
PDW BLD-RTO: 13.2 FL (ref 11.5–15)
PLATELET # BLD: 150 E9/L (ref 130–450)
PMV BLD AUTO: 11.2 FL (ref 7–12)
POTASSIUM SERPL-SCNC: 3.9 MMOL/L (ref 3.5–5)
RBC # BLD: 4.39 E12/L (ref 3.8–5.8)
SODIUM BLD-SCNC: 145 MMOL/L (ref 132–146)
WBC # BLD: 5.2 E9/L (ref 4.5–11.5)

## 2020-11-07 PROCEDURE — 82962 GLUCOSE BLOOD TEST: CPT

## 2020-11-07 PROCEDURE — 2580000003 HC RX 258: Performed by: INTERNAL MEDICINE

## 2020-11-07 PROCEDURE — 6370000000 HC RX 637 (ALT 250 FOR IP): Performed by: INTERNAL MEDICINE

## 2020-11-07 PROCEDURE — 85025 COMPLETE CBC W/AUTO DIFF WBC: CPT

## 2020-11-07 PROCEDURE — 99231 SBSQ HOSP IP/OBS SF/LOW 25: CPT | Performed by: INTERNAL MEDICINE

## 2020-11-07 PROCEDURE — 36415 COLL VENOUS BLD VENIPUNCTURE: CPT

## 2020-11-07 PROCEDURE — 80048 BASIC METABOLIC PNL TOTAL CA: CPT

## 2020-11-07 PROCEDURE — 6360000002 HC RX W HCPCS: Performed by: INTERNAL MEDICINE

## 2020-11-07 PROCEDURE — 2060000000 HC ICU INTERMEDIATE R&B

## 2020-11-07 RX ORDER — SODIUM CHLORIDE 450 MG/100ML
INJECTION, SOLUTION INTRAVENOUS CONTINUOUS
Status: DISCONTINUED | OUTPATIENT
Start: 2020-11-07 | End: 2020-11-09 | Stop reason: HOSPADM

## 2020-11-07 RX ORDER — ATORVASTATIN CALCIUM 40 MG/1
40 TABLET, FILM COATED ORAL NIGHTLY
Status: DISCONTINUED | OUTPATIENT
Start: 2020-11-07 | End: 2020-11-09 | Stop reason: HOSPADM

## 2020-11-07 RX ORDER — ATORVASTATIN CALCIUM 40 MG/1
40 TABLET, FILM COATED ORAL NIGHTLY
Qty: 30 TABLET | Refills: 3 | Status: CANCELLED | OUTPATIENT
Start: 2020-11-07

## 2020-11-07 RX ADMIN — ENOXAPARIN SODIUM 40 MG: 40 INJECTION SUBCUTANEOUS at 09:52

## 2020-11-07 RX ADMIN — Medication 10 ML: at 09:52

## 2020-11-07 RX ADMIN — SODIUM CHLORIDE: 4.5 INJECTION, SOLUTION INTRAVENOUS at 04:15

## 2020-11-07 RX ADMIN — INSULIN LISPRO 10 UNITS: 100 INJECTION, SOLUTION INTRAVENOUS; SUBCUTANEOUS at 17:01

## 2020-11-07 RX ADMIN — INSULIN GLARGINE 50 UNITS: 100 INJECTION, SOLUTION SUBCUTANEOUS at 20:01

## 2020-11-07 RX ADMIN — SODIUM CHLORIDE, PRESERVATIVE FREE 10 ML: 5 INJECTION INTRAVENOUS at 12:41

## 2020-11-07 RX ADMIN — INSULIN LISPRO 10 UNITS: 100 INJECTION, SOLUTION INTRAVENOUS; SUBCUTANEOUS at 06:34

## 2020-11-07 RX ADMIN — SODIUM CHLORIDE: 4.5 INJECTION, SOLUTION INTRAVENOUS at 12:41

## 2020-11-07 RX ADMIN — ATORVASTATIN CALCIUM 40 MG: 40 TABLET, FILM COATED ORAL at 20:01

## 2020-11-07 RX ADMIN — INSULIN LISPRO 3 UNITS: 100 INJECTION, SOLUTION INTRAVENOUS; SUBCUTANEOUS at 06:35

## 2020-11-07 RX ADMIN — ASPIRIN 81 MG CHEWABLE TABLET 81 MG: 81 TABLET CHEWABLE at 09:51

## 2020-11-07 RX ADMIN — LISINOPRIL 5 MG: 5 TABLET ORAL at 09:51

## 2020-11-07 RX ADMIN — INSULIN LISPRO 6 UNITS: 100 INJECTION, SOLUTION INTRAVENOUS; SUBCUTANEOUS at 17:00

## 2020-11-07 RX ADMIN — PANTOPRAZOLE SODIUM 40 MG: 40 TABLET, DELAYED RELEASE ORAL at 06:35

## 2020-11-07 ASSESSMENT — PAIN SCALES - GENERAL
PAINLEVEL_OUTOF10: 0

## 2020-11-07 NOTE — PROGRESS NOTES
Dr. Caroline lane perfect serve for patient having an evening blood sugar of 339 but no coverage. New orders placed.     Elma Galeano RN

## 2020-11-07 NOTE — PROGRESS NOTES
Notified IM VICKI of lunch POCT blood sugar 115. No coverage per sliding scale but is still ordered a straight 10 units of Humalog. Verifying if 10 units Humalog should be held or still given. 1254: Okay to hold 10 units of Humalog at this time.

## 2020-11-07 NOTE — PROGRESS NOTES
Dr. Rodrigo Garcia via perfect serve for clarification if Lantus should be started tonight or tomorrow. Lantus to start tonight. See new orders.      Ethyl Isabell RN

## 2020-11-07 NOTE — PROGRESS NOTES
BRIEF EP PROGRESS NOTE    Telemetry: Sinus rhythm    A/P:   1. NSVT  -In setting of DKA. -Continue to monitor on telemetry. - Exercise stress and echo normal. He can be discharged from EP perspective. -Maintain K+ > 4 and Mg++ > 2.

## 2020-11-07 NOTE — PROGRESS NOTES
[facility]  Internal Medicine Residency Program  Progress Note - House Team 1    Patient:  Kassidy aVrgas 53 y.o. male MRN: [id number]     Date of Service: 11/7/2020     CC: Fatigue and shortness of breath  Overnight events: No acute events overnight    Subjective     Patient seen and examined at bedside this AM.    No new complaints. Denies headache, lightheadedness, chest pain, palpitations, nausea, vomiting, or abdominal pain. Sodium 145, creatinine 1.2, blood glucose 174 today. No further arrhythmia noted. Objective     Physical Exam:  · Vitals: /68   Pulse 74   Temp 97.7 °F (36.5 °C) (Temporal)   Resp 14   Ht 6' (1.829 m)   Wt 272 lb 4.8 oz (123.5 kg)   SpO2 98%   BMI 36.93 kg/m²     I & O - 24hr: I/O this shift:  In: 250 [P.O.:240; I.V.:10]  · Out: 400 [Urine:400]   · General Appearance: alert, appears stated age, cooperative and no distress  · HEENT:  Head: Normal, normocephalic, atraumatic. · Eye: Normal external eye, conjunctiva, lids cornea, RANDI. · Neck: no adenopathy, no JVD, supple, symmetrical, trachea midline and thyroid not enlarged, symmetric, no tenderness/mass/nodules  · Lung: clear to auscultation bilaterally  · Heart: regular rate and rhythm, S1, S2 normal, no murmur, click, rub or gallop  · Abdomen: soft, non-tender; bowel sounds normal; no masses,  no organomegaly  · Extremities:  extremities normal, atraumatic, no cyanosis or edema  · Musculokeletal: No joint swelling, no muscle tenderness. ROM normal in all joints of extremities.    · Neurologic: Mental status: Alert, oriented, thought content appropriate  Subject  Pertinent Labs & Imaging Studies   amy  CBC with Differential:    Lab Results   Component Value Date    WBC 5.2 11/07/2020    RBC 4.39 11/07/2020    HGB 12.7 11/07/2020    HCT 39.0 11/07/2020     11/07/2020    MCV 88.8 11/07/2020    MCH 28.9 11/07/2020    MCHC 32.6 11/07/2020    RDW 13.2 11/07/2020    LYMPHOPCT 21.0 11/07/2020 MONOPCT 11.5 11/07/2020    BASOPCT 0.2 11/07/2020    MONOSABS 0.60 11/07/2020    LYMPHSABS 1.09 11/07/2020    EOSABS 0.14 11/07/2020    BASOSABS 0.01 11/07/2020     CMP:    Lab Results   Component Value Date     11/07/2020    K 3.9 11/07/2020     11/07/2020    CO2 25 11/07/2020    BUN 21 11/07/2020    CREATININE 1.2 11/07/2020    GFRAA >60 11/07/2020    LABGLOM >60 11/07/2020    GLUCOSE 174 11/07/2020    PROT 6.2 11/04/2020    LABALBU 3.2 11/04/2020    CALCIUM 8.8 11/07/2020    BILITOT 0.5 11/04/2020    ALKPHOS 80 11/04/2020    AST 23 11/04/2020    ALT 41 11/04/2020       Resident's Assessment and Plan     Lamont Gavin is a 53 y.o. male with a past medical history of prediabetes, who presented on 11/01/2020 with generalized fatigue, polyuria, polydipsia, and polyphagia for 3 to 4 days. 1.  DKA - resolved   -Newly diagnosed diabetic mellitus not on medication   -Afebrile, procalcitonin 0.27, WBC 11.7, CRP 2.8, urinalysis negative for UTI, urine culture and blood culture NGTD, urine Legionella antigen and strep pneumo antigen and MRSA screening negative   -Blood glucose 766, anion gap 33, bicarb 15, beta-hydroxybutyrate >4.5, urine ketone >80   -Insulin drip initiated   -Bridge to subcutaneous insulin when anion gap closed x 2 and patient can eat   -Follow POCT glucose AC/HS, BMP, Mg and Phos every 4 hours   -Urine, blood, MRSA cx negative, urine Legionella antigen and strep pneumo antigen negative   -Anion gap closed x2, CO2 >20, bridged to subcutaneous insulin    2. Diabetes mellitus likely mixed type I and type II   -Hemoglobin A1c 11.2, not on medication   -Late onset diabetes mellitus   -Presented with DKA with blood glucose 766   -Follow JUNIE antibody,    -lipid panel unremarkable   -Diabetic education   -Increase Lantus from 35 to 40 to 50 units, premeal from 5 to 10 units and high-dose SS   -Carb controlled diet   -POCT glucose 4 times daily   -ASCVD risk 29.4%, start Lipitor 40 mg     3.   ANDRAE, likely hemodynamically mediated, FeUrea 23.2% -improving   -Baseline creatinine level unknown   -Creatinine 1.8 on presentation   -Microalbumin/creatinine ratio 18.8   -Continue to monitor renal function   -Continue volume resuscitation   -Follow renal ultrasound   -Creatinine 1.2 on 11/5    -Creatinine 1.4 on 11/6, start IVF    4. Hypovolemic hypernatremia likely 2/2 excessive diuresis 2/2 hypoglycemia - improving   -Sodium 157 on presentation, corrected sodium level 168   -Free water deficit 12.8 L   -Continue to monitor sodium level   -Continue D5W at 200 cc/h per nephro   -Encourage p.o. free water intake   -Nephrology following   -Continue to encourage p.o. water intake   -Sodium level initially trending down slowly and then trending up to 151, resume 1/2 normal saline   -Continue 1/2 normal saline    5. Hypertension   -/125 on presentation and >130/80 on multiple measures   -On lisinopril 5 mg daily, hydralazine and labetalol as needed    6. Hypophosphatemia - resolved   -Replace as needed    7. Nonsustained V. Tach   -Sinus rhythm with ~7-second episode of NSVT   -Continue to monitor on telemetry   -Maintain K >4 and Mg >2   -EP consulted. Appreciate recs. Exercise stress echo   -Stress test with no EKG change, myocardial perfusion imaging normal.  Low risk exercise myocardial perfusion imaging study.    -Echo on 11/06/2020 showing normal LV systolic function with EF 60 to 65%, indeterminate diastolic function, no regional wall motion abnormalities, normal RV function, mild aortic valve regurgitation.     PT/OT evaluation: Not indicated at this time  DVT prophylaxis/ GI prophylaxis: Lovenox/diet  Disposition: Discharge planning    Iglesia Montalvo MD, PGY-1  Attending physician: Dr. Cherry Pineda

## 2020-11-07 NOTE — PROGRESS NOTES
Department of Internal Medicine  Nephrology Progress Note    Events reviewed. SUBJECTIVE: We are following MrTyrell Rela for hypernatremia and ANDRAE. Reports no complaints.      PHYSICAL EXAM:      Vitals:    VITALS:  /77   Pulse 73   Temp 97 °F (36.1 °C) (Temporal)   Resp 16   Ht 6' (1.829 m)   Wt 272 lb 4.8 oz (123.5 kg)   SpO2 99%   BMI 36.93 kg/m²   24HR INTAKE/OUTPUT:      Intake/Output Summary (Last 24 hours) at 11/7/2020 1517  Last data filed at 11/7/2020 1446  Gross per 24 hour   Intake 1779.05 ml   Output 2475 ml   Net -695.95 ml       Constitutional: Patient is awake alert in no distress  HEENT: Pupils are equal reactive, mucous membranes moist  Respiratory: Lungs are clear  Cardiovascular/Edema: Heart sounds are regular rate and rhythm  Gastrointestinal: Abdomen is soft nontender  Neurologic: Nonfocal  Skin: No lesions no edema  Other: No edema    Scheduled Meds:   atorvastatin  40 mg Oral Nightly    insulin glargine  50 Units Subcutaneous Nightly    insulin lispro  10 Units Subcutaneous TID WC    lisinopril  5 mg Oral Daily    pantoprazole  40 mg Oral QAM AC    insulin lispro  0-18 Units Subcutaneous TID WC    aspirin  81 mg Oral Daily    enoxaparin  40 mg Subcutaneous Daily    sodium chloride flush  10 mL Intravenous 2 times per day     Continuous Infusions:   sodium chloride 50 mL/hr at 11/07/20 1241    dextrose       PRN Meds:.hydrALAZINE, labetalol, glucose, glucagon (rDNA), dextrose, dextrose, dextrose, sodium chloride flush, acetaminophen **OR** acetaminophen, magnesium hydroxide, promethazine **OR** ondansetron    DATA:    CBC:   Lab Results   Component Value Date    WBC 5.2 11/07/2020    RBC 4.39 11/07/2020    HGB 12.7 11/07/2020    HCT 39.0 11/07/2020    MCV 88.8 11/07/2020    MCH 28.9 11/07/2020    MCHC 32.6 11/07/2020    RDW 13.2 11/07/2020     11/07/2020    MPV 11.2 11/07/2020     CMP:    Lab Results   Component Value Date     11/07/2020    K 3.9 11/07/2020 function  · Continue to monitor sodium levels

## 2020-11-07 NOTE — PROGRESS NOTES
[facility]  Internal Medicine Residency / [address]    Attending Physician Statement, covering attending  I have discussed the case, including pertinent history and exam findings with the resident and the team.  I have seen and examined the patient, reviewed meds and pertinent labs and the key elements of the encounter have been performed by me. I agree with the assessment, plan and orders as documented by the resident. Patient is long distance . He plans to fly home tomorrow and leave his truck here. He states that he will establish a PCP as soon as he gets home. Patient had new onset DM with DKA and acute kidney injury. He has also been hypernatremic. Creatinine is down to 1.2 and Na+ 145. Patient also hypertensive. He currently has no symptoms and feels well. Has not had any further arrhythmia. Will plan for d/c tomorrow. Remainder of medical problems as per resident note.       Pro Sanchez  Internal Medicine Residency Faculty

## 2020-11-08 LAB
ANION GAP SERPL CALCULATED.3IONS-SCNC: 9 MMOL/L (ref 7–16)
BASOPHILS ABSOLUTE: 0.01 E9/L (ref 0–0.2)
BASOPHILS RELATIVE PERCENT: 0.2 % (ref 0–2)
BUN BLDV-MCNC: 15 MG/DL (ref 6–20)
CALCIUM SERPL-MCNC: 8.5 MG/DL (ref 8.6–10.2)
CHLORIDE BLD-SCNC: 108 MMOL/L (ref 98–107)
CO2: 25 MMOL/L (ref 22–29)
CREAT SERPL-MCNC: 1.2 MG/DL (ref 0.7–1.2)
EOSINOPHILS ABSOLUTE: 0.11 E9/L (ref 0.05–0.5)
EOSINOPHILS RELATIVE PERCENT: 2.4 % (ref 0–6)
GFR AFRICAN AMERICAN: >60
GFR NON-AFRICAN AMERICAN: >60 ML/MIN/1.73
GLUCOSE BLD-MCNC: 178 MG/DL (ref 74–99)
HCT VFR BLD CALC: 37.9 % (ref 37–54)
HEMOGLOBIN: 12.1 G/DL (ref 12.5–16.5)
IMMATURE GRANULOCYTES #: 0.02 E9/L
IMMATURE GRANULOCYTES %: 0.4 % (ref 0–5)
LYMPHOCYTES ABSOLUTE: 1.16 E9/L (ref 1.5–4)
LYMPHOCYTES RELATIVE PERCENT: 25.8 % (ref 20–42)
MCH RBC QN AUTO: 28.5 PG (ref 26–35)
MCHC RBC AUTO-ENTMCNC: 31.9 % (ref 32–34.5)
MCV RBC AUTO: 89.2 FL (ref 80–99.9)
METER GLUCOSE: 161 MG/DL (ref 74–99)
METER GLUCOSE: 187 MG/DL (ref 74–99)
METER GLUCOSE: 202 MG/DL (ref 74–99)
METER GLUCOSE: 226 MG/DL (ref 74–99)
METER GLUCOSE: 62 MG/DL (ref 74–99)
METER GLUCOSE: 63 MG/DL (ref 74–99)
MONOCYTES ABSOLUTE: 0.47 E9/L (ref 0.1–0.95)
MONOCYTES RELATIVE PERCENT: 10.5 % (ref 2–12)
NEUTROPHILS ABSOLUTE: 2.72 E9/L (ref 1.8–7.3)
NEUTROPHILS RELATIVE PERCENT: 60.7 % (ref 43–80)
PDW BLD-RTO: 13.1 FL (ref 11.5–15)
PLATELET # BLD: 167 E9/L (ref 130–450)
PMV BLD AUTO: 11 FL (ref 7–12)
POTASSIUM SERPL-SCNC: 3.8 MMOL/L (ref 3.5–5)
RBC # BLD: 4.25 E12/L (ref 3.8–5.8)
SODIUM BLD-SCNC: 142 MMOL/L (ref 132–146)
WBC # BLD: 4.5 E9/L (ref 4.5–11.5)

## 2020-11-08 PROCEDURE — 6370000000 HC RX 637 (ALT 250 FOR IP): Performed by: INTERNAL MEDICINE

## 2020-11-08 PROCEDURE — 82962 GLUCOSE BLOOD TEST: CPT

## 2020-11-08 PROCEDURE — 99231 SBSQ HOSP IP/OBS SF/LOW 25: CPT | Performed by: INTERNAL MEDICINE

## 2020-11-08 PROCEDURE — 6360000002 HC RX W HCPCS: Performed by: INTERNAL MEDICINE

## 2020-11-08 PROCEDURE — 36415 COLL VENOUS BLD VENIPUNCTURE: CPT

## 2020-11-08 PROCEDURE — 85025 COMPLETE CBC W/AUTO DIFF WBC: CPT

## 2020-11-08 PROCEDURE — 80048 BASIC METABOLIC PNL TOTAL CA: CPT

## 2020-11-08 PROCEDURE — 2580000003 HC RX 258: Performed by: INTERNAL MEDICINE

## 2020-11-08 PROCEDURE — 2060000000 HC ICU INTERMEDIATE R&B

## 2020-11-08 RX ADMIN — Medication 10 ML: at 08:55

## 2020-11-08 RX ADMIN — PANTOPRAZOLE SODIUM 40 MG: 40 TABLET, DELAYED RELEASE ORAL at 06:18

## 2020-11-08 RX ADMIN — INSULIN GLARGINE 50 UNITS: 100 INJECTION, SOLUTION SUBCUTANEOUS at 21:01

## 2020-11-08 RX ADMIN — LISINOPRIL 5 MG: 5 TABLET ORAL at 08:55

## 2020-11-08 RX ADMIN — ASPIRIN 81 MG CHEWABLE TABLET 81 MG: 81 TABLET CHEWABLE at 08:55

## 2020-11-08 RX ADMIN — INSULIN LISPRO 10 UNITS: 100 INJECTION, SOLUTION INTRAVENOUS; SUBCUTANEOUS at 17:06

## 2020-11-08 RX ADMIN — INSULIN LISPRO 10 UNITS: 100 INJECTION, SOLUTION INTRAVENOUS; SUBCUTANEOUS at 06:17

## 2020-11-08 RX ADMIN — ATORVASTATIN CALCIUM 40 MG: 40 TABLET, FILM COATED ORAL at 21:01

## 2020-11-08 RX ADMIN — INSULIN LISPRO 6 UNITS: 100 INJECTION, SOLUTION INTRAVENOUS; SUBCUTANEOUS at 17:05

## 2020-11-08 RX ADMIN — SODIUM CHLORIDE: 4.5 INJECTION, SOLUTION INTRAVENOUS at 06:53

## 2020-11-08 RX ADMIN — INSULIN LISPRO 3 UNITS: 100 INJECTION, SOLUTION INTRAVENOUS; SUBCUTANEOUS at 06:17

## 2020-11-08 RX ADMIN — ENOXAPARIN SODIUM 40 MG: 40 INJECTION SUBCUTANEOUS at 08:55

## 2020-11-08 ASSESSMENT — PAIN SCALES - GENERAL
PAINLEVEL_OUTOF10: 0

## 2020-11-08 NOTE — PROGRESS NOTES
[facility]  Internal Medicine Residency / [address]    Attending Physician Statement  I have discussed the case, including pertinent history and exam findings with the resident and the team.  I have seen and examined the patient, reviewed meds and pertinent labs and the key elements of the encounter have been performed by me. I agree with the assessment, plan and orders as documented by the resident. Patient doing weel. Main concern before safe discharge since he is from [facility] is that patient needs diabetic ed (has not given himself insulin) and will also need glucometer in addition to meds. Remainder of medical problems as per resident note.       Arabella Griffiths  Internal Medicine Residency Faculty

## 2020-11-08 NOTE — PROGRESS NOTES
Department of Internal Medicine  Nephrology Progress Note    Events reviewed. SUBJECTIVE: We are following Mr. Naldo Jordan for hypernatremia and ANDRAE. Reports no complaints.      PHYSICAL EXAM:      Vitals:    VITALS:  /64   Pulse 72   Temp 97.8 °F (36.6 °C) (Temporal)   Resp 20   Ht 6' (1.829 m)   Wt 273 lb 6.4 oz (124 kg)   SpO2 98%   BMI 37.08 kg/m²   24HR INTAKE/OUTPUT:      Intake/Output Summary (Last 24 hours) at 11/8/2020 1516  Last data filed at 11/8/2020 1136  Gross per 24 hour   Intake 1957.5 ml   Output 3990 ml   Net -2032.5 ml       Constitutional: Patient is awake alert in no distress  HEENT: Pupils are equal reactive, mucous membranes moist  Respiratory: Lungs are clear  Cardiovascular/Edema: Heart sounds are regular rate and rhythm  Gastrointestinal: Abdomen is soft nontender  Neurologic: Nonfocal  Skin: No lesions no edema  Other: No edema    Scheduled Meds:   atorvastatin  40 mg Oral Nightly    influenza virus vaccine  0.5 mL Intramuscular Prior to discharge    insulin glargine  50 Units Subcutaneous Nightly    insulin lispro  10 Units Subcutaneous TID WC    lisinopril  5 mg Oral Daily    pantoprazole  40 mg Oral QAM AC    insulin lispro  0-18 Units Subcutaneous TID WC    aspirin  81 mg Oral Daily    enoxaparin  40 mg Subcutaneous Daily    sodium chloride flush  10 mL Intravenous 2 times per day     Continuous Infusions:   sodium chloride 50 mL/hr at 11/08/20 0653    dextrose       PRN Meds:.hydrALAZINE, labetalol, glucose, glucagon (rDNA), dextrose, dextrose, dextrose, sodium chloride flush, acetaminophen **OR** acetaminophen, magnesium hydroxide, promethazine **OR** ondansetron    DATA:    CBC:   Lab Results   Component Value Date    WBC 4.5 11/08/2020    RBC 4.25 11/08/2020    HGB 12.1 11/08/2020    HCT 37.9 11/08/2020    MCV 89.2 11/08/2020    MCH 28.5 11/08/2020    MCHC 31.9 11/08/2020    RDW 13.1 11/08/2020     11/08/2020    MPV 11.0 11/08/2020     CMP:    Lab Results   Component Value Date     11/08/2020    K 3.8 11/08/2020     11/08/2020    CO2 25 11/08/2020    BUN 15 11/08/2020    CREATININE 1.2 11/08/2020    GFRAA >60 11/08/2020    LABGLOM >60 11/08/2020    GLUCOSE 178 11/08/2020    PROT 6.2 11/04/2020    LABALBU 3.2 11/04/2020    CALCIUM 8.5 11/08/2020    BILITOT 0.5 11/04/2020    ALKPHOS 80 11/04/2020    AST 23 11/04/2020    ALT 41 11/04/2020     Magnesium:    Lab Results   Component Value Date    MG 2.6 11/05/2020     Phosphorus:    Lab Results   Component Value Date    PHOS 2.7 11/05/2020      Urine albumin/creatinine: 18.8 mg/grams   Urine protein/creatinine: 0.2 g/grams      Radiology Review:      Chest x-ray November 1, 2020   1.  Slightly limited exam, grossly negative for acute process.                     BRIEF SUMMARY OF INITIAL CONSULT:    Briefly Mr. Arturo Huynh is a 65-year-old man with unremarkable past medical history, was a  and who presented to the ER reporting feeling quite fatigue and short of breath. After evaluation he was found to have a glucose >700 mg/dL, sodium 157 mEq/L, creatinine 1.8 mg/dL, reasons for this consultation. Patient reports that he has been feeling very thirsty, he is urinating all the time and he has had nocturia for the last 2 weeks. Denies any nausea vomiting or diarrhea. Problems resolved:  DKA, ketones in urine, beta hydroxybutyrate >4.5, bicarbonate levels 15 mEq/L, anion gap improved as well as bicarbonate levels. IMPRESSION/RECOMMENDATIONS:        1. ANDRAE stage I, volume responsive prerenal ANDRAE, secondary to urinary losses induced by osmotic diuresis (hyperglycemia). · Creatinine 1.4 mg/dL today with adequate urine output. 2. Hypernatremia, with profound dehydration secondary to urinary losses, osmotic diuresis. · Sodium 151 mmol/L today. To start scheduled PO free water.     3. New onset DM, presented with DKA, glucose levels improved    Plan:    · Patient to drink 250 mL water every hour while awake  · Encourage oral fluids  · Continue to monitor renal function  · Continue to monitor sodium levels  · Will follow peripherally    Azalea Ormond MD

## 2020-11-08 NOTE — PLAN OF CARE
Problem: Falls - Risk of:  Goal: Will remain free from falls  Description: Will remain free from falls  Outcome: Met This Shift     Problem: Serum Glucose Level - Abnormal:  Goal: Ability to maintain appropriate glucose levels will improve  Description: Ability to maintain appropriate glucose levels will improve  Outcome: Met This Shift

## 2020-11-08 NOTE — PROGRESS NOTES
[facility]  Internal Medicine Residency Program  Progress Note - House Team 1    Patient:  Adi Butterfield 53 y.o. male MRN: [id number]     Date of Service: 11/8/2020     CC: Fatigue and shortness of breath  Overnight events: No acute events overnight    Subjective     Patient seen and examined at bedside this AM.    Patient is feeling well, no new complaints. Denies headache, lightheadedness, chest pain, palpitation, diaphoresis, nausea, vomiting, abdominal pain. Sodium 142, creatinine 1.2, blood glucose 187 today. No further arrhythmia noted. Objective     Physical Exam:  · Vitals: /64   Pulse 72   Temp 97.8 °F (36.6 °C) (Temporal)   Resp 20   Ht 6' (1.829 m)   Wt 273 lb 6.4 oz (124 kg)   SpO2 98%   BMI 37.08 kg/m²     I & O - 24hr: I/O this shift:  In: 240 [P.O.:240]  · Out: 1250 [Urine:1250]   · General Appearance: alert, appears stated age, cooperative and no distress  · HEENT:  Head: Normal, normocephalic, atraumatic. · Eye: Normal external eye, conjunctiva, lids cornea, RANDI. · Neck: no adenopathy, no JVD, supple, symmetrical, trachea midline and thyroid not enlarged, symmetric, no tenderness/mass/nodules  · Lung: clear to auscultation bilaterally  · Heart: regular rate and rhythm, S1, S2 normal, no murmur, click, rub or gallop  · Abdomen: soft, non-tender; bowel sounds normal; no masses,  no organomegaly  · Extremities:  extremities normal, atraumatic, no cyanosis or edema  · Musculokeletal: No joint swelling, no muscle tenderness. ROM normal in all joints of extremities.    · Neurologic: Mental status: Alert, oriented, thought content appropriate  Subject  Pertinent Labs & Imaging Studies   amy  CBC with Differential:    Lab Results   Component Value Date    WBC 4.5 11/08/2020    RBC 4.25 11/08/2020    HGB 12.1 11/08/2020    HCT 37.9 11/08/2020     11/08/2020    MCV 89.2 11/08/2020    MCH 28.5 11/08/2020    MCHC 31.9 11/08/2020    RDW 13.1 11/08/2020 LYMPHOPCT 25.8 11/08/2020    MONOPCT 10.5 11/08/2020    BASOPCT 0.2 11/08/2020    MONOSABS 0.47 11/08/2020    LYMPHSABS 1.16 11/08/2020    EOSABS 0.11 11/08/2020    BASOSABS 0.01 11/08/2020     CMP:    Lab Results   Component Value Date     11/08/2020    K 3.8 11/08/2020     11/08/2020    CO2 25 11/08/2020    BUN 15 11/08/2020    CREATININE 1.2 11/08/2020    GFRAA >60 11/08/2020    LABGLOM >60 11/08/2020    GLUCOSE 178 11/08/2020    PROT 6.2 11/04/2020    LABALBU 3.2 11/04/2020    CALCIUM 8.5 11/08/2020    BILITOT 0.5 11/04/2020    ALKPHOS 80 11/04/2020    AST 23 11/04/2020    ALT 41 11/04/2020       Resident's Assessment and Plan     Vinod Davies is a 53 y.o. male with a past medical history of prediabetes, who presented on 11/01/2020 with generalized fatigue, polyuria, polydipsia, and polyphagia for 3 to 4 days. 1.  DKA - resolved   -Newly diagnosed diabetic mellitus not on medication   -Afebrile, procalcitonin 0.27, WBC 11.7, CRP 2.8, urinalysis negative for UTI, urine culture and blood culture NGTD, urine Legionella antigen and strep pneumo antigen and MRSA screening negative   -Blood glucose 766, anion gap 33, bicarb 15, beta-hydroxybutyrate >4.5, urine ketone >80   -Insulin drip initiated   -Bridge to subcutaneous insulin when anion gap closed x 2 and patient can eat   -Follow POCT glucose AC/HS, BMP, Mg and Phos every 4 hours   -Urine, blood, MRSA cx negative, urine Legionella antigen and strep pneumo antigen negative   -Anion gap closed x2, CO2 >20, bridged to subcutaneous insulin    2.   Diabetes mellitus likely mixed type I and type II   -Hemoglobin A1c 11.2, not on medication   -Late onset diabetes mellitus   -Presented with DKA with blood glucose 766   -Follow JUNIE antibody,    -lipid panel unremarkable   -Diabetic education: Need to repeat to reinforce blood glucose monitoring and self administration of insulin before discharge   -Increase Lantus from 35 to 40 to 50 units, premeal from 5 to 10 units and high-dose SS   -Carb controlled diet   -POCT glucose 4 times daily   -ASCVD risk 29.4%, start Lipitor 40 mg     3. ANDRAE, likely hemodynamically mediated, FeUrea 23.2% -resolved   -Baseline creatinine level unknown   -Creatinine 1.8 on presentation   -Microalbumin/creatinine ratio 18.8   -Continue to monitor renal function   -Continue volume resuscitation   -Follow renal ultrasound   -Continue to encourage p.o. water intake   -Creatinine 1.2 on 11/5    -Creatinine 1.4 on 11/6, start IVF    4. Hypovolemic hypernatremia likely 2/2 excessive diuresis 2/2 hypoglycemia -resolved   -Sodium 157 on presentation, corrected sodium level 168   -Free water deficit 12.8 L   -Continue to monitor sodium level   -Continue D5W at 200 cc/h per nephro   -Encourage p.o. free water intake   -Nephrology following   -Continue to encourage p.o. water intake   -Sodium level initially trending down slowly and then trending up to 151, resume 1/2 normal saline   -Continue 1/2 normal saline    5. Hypertension   -/125 on presentation and >130/80 on multiple measures   -On lisinopril 5 mg daily, hydralazine and labetalol as needed    6. Hypophosphatemia - resolved   -Replace as needed    7. Nonsustained V. Tach   -Sinus rhythm with ~7-second episode of NSVT   -Continue to monitor on telemetry   -Maintain K >4 and Mg >2   -EP consulted. Appreciate recs. Exercise stress echo   -Stress test with no EKG change, myocardial perfusion imaging normal.  Low risk exercise myocardial perfusion imaging study.    -Echo on 11/06/2020 showing normal LV systolic function with EF 60 to 65%, indeterminate diastolic function, no regional wall motion abnormalities, normal RV function, mild aortic valve regurgitation.     PT/OT evaluation: Not indicated at this time  DVT prophylaxis/ GI prophylaxis: Lovenox/diet  Disposition: Discharge planning    Iglesia Montalvo MD, PGY-1  Attending physician: Dr. Cherry Pineda

## 2020-11-08 NOTE — PLAN OF CARE
Updated HPI    Sandra Grover a 53 y. o. male with a past medical history of prediabetes, who presented with generalized fatigue, polyuria, polydipsia and blurry vision for 3-4 days. Patient reported that he drank a lot of Gatorade and juice but still felt very thirsty. Denied headache, trauma to head, change of hearing, chest pain, palpitation, fever, chills, productive cough, abdominal pain, nausea, vomiting, or diarrhea.      Lab results in the ED found elevated glucose level more than 700 and positive beta hydroxybutyrate of more than 4.5, anion gap of 33, Na 157, Cr 1.8. Patient was started on insulin drip, IV fluid and electrolytes replacement and admitted to ICU for DKA management. Patient's blood glucose level decreased, anion gap closed, acidosis corrected. Patient was stable and transferred out of ICU. His hypernatremia and renal function improved with IV fluid. Diabetic education was provided however patient is not confident to monitor his blood glucose and gave himself insulin. Need to make sure he has another session of diabetes education before discharge. Patient is currently on 50 units Lantus and 10 units premeal and HDSS. Blood glucose ~180-200.     On hospital day 3, patient developed 15 beats of nonsustained V tach. VSS. Eletrolytes within normal limits except for Mg of 2.7. Stress test showed no EKG change, myocardial perfusion imaging normal. Low risk exercise myocardial perfusion imaging study. Echo on 11/06/2020 showing normal LV systolic function with EF 60 to 65%, indeterminate diastolic function, no regional wall motion abnormalities, normal RV function, mild aortic valve regurgitation. No further arrhythmic events was seen.     Currently:  Continue lantus 50 units night, humalog 10 units pre-meal and HDSS  Need diabetic education  Arrhythmia work up done   Discharge tomorrow

## 2020-11-09 VITALS
WEIGHT: 275.4 LBS | DIASTOLIC BLOOD PRESSURE: 80 MMHG | RESPIRATION RATE: 20 BRPM | OXYGEN SATURATION: 99 % | BODY MASS INDEX: 37.3 KG/M2 | HEIGHT: 72 IN | HEART RATE: 72 BPM | SYSTOLIC BLOOD PRESSURE: 123 MMHG | TEMPERATURE: 97.1 F

## 2020-11-09 LAB
ANION GAP SERPL CALCULATED.3IONS-SCNC: 9 MMOL/L (ref 7–16)
BASOPHILS ABSOLUTE: 0.01 E9/L (ref 0–0.2)
BASOPHILS RELATIVE PERCENT: 0.2 % (ref 0–2)
BUN BLDV-MCNC: 13 MG/DL (ref 6–20)
CALCIUM SERPL-MCNC: 8.6 MG/DL (ref 8.6–10.2)
CHLORIDE BLD-SCNC: 104 MMOL/L (ref 98–107)
CO2: 25 MMOL/L (ref 22–29)
CREAT SERPL-MCNC: 1.3 MG/DL (ref 0.7–1.2)
EOSINOPHILS ABSOLUTE: 0.13 E9/L (ref 0.05–0.5)
EOSINOPHILS RELATIVE PERCENT: 2.4 % (ref 0–6)
GFR AFRICAN AMERICAN: >60
GFR NON-AFRICAN AMERICAN: >60 ML/MIN/1.73
GLUCOSE BLD-MCNC: 209 MG/DL (ref 74–99)
HCT VFR BLD CALC: 36.2 % (ref 37–54)
HEMOGLOBIN: 11.7 G/DL (ref 12.5–16.5)
IMMATURE GRANULOCYTES #: 0.02 E9/L
IMMATURE GRANULOCYTES %: 0.4 % (ref 0–5)
LYMPHOCYTES ABSOLUTE: 1.3 E9/L (ref 1.5–4)
LYMPHOCYTES RELATIVE PERCENT: 23.6 % (ref 20–42)
MCH RBC QN AUTO: 28.8 PG (ref 26–35)
MCHC RBC AUTO-ENTMCNC: 32.3 % (ref 32–34.5)
MCV RBC AUTO: 89.2 FL (ref 80–99.9)
METER GLUCOSE: 159 MG/DL (ref 74–99)
METER GLUCOSE: 210 MG/DL (ref 74–99)
METER GLUCOSE: 85 MG/DL (ref 74–99)
MONOCYTES ABSOLUTE: 0.97 E9/L (ref 0.1–0.95)
MONOCYTES RELATIVE PERCENT: 17.6 % (ref 2–12)
NEUTROPHILS ABSOLUTE: 3.07 E9/L (ref 1.8–7.3)
NEUTROPHILS RELATIVE PERCENT: 55.8 % (ref 43–80)
PDW BLD-RTO: 12.7 FL (ref 11.5–15)
PLATELET # BLD: 183 E9/L (ref 130–450)
PMV BLD AUTO: 10.7 FL (ref 7–12)
POTASSIUM SERPL-SCNC: 4.1 MMOL/L (ref 3.5–5)
RBC # BLD: 4.06 E12/L (ref 3.8–5.8)
SODIUM BLD-SCNC: 138 MMOL/L (ref 132–146)
WBC # BLD: 5.5 E9/L (ref 4.5–11.5)

## 2020-11-09 PROCEDURE — 82962 GLUCOSE BLOOD TEST: CPT

## 2020-11-09 PROCEDURE — 36415 COLL VENOUS BLD VENIPUNCTURE: CPT

## 2020-11-09 PROCEDURE — 6370000000 HC RX 637 (ALT 250 FOR IP): Performed by: INTERNAL MEDICINE

## 2020-11-09 PROCEDURE — 2580000003 HC RX 258: Performed by: INTERNAL MEDICINE

## 2020-11-09 PROCEDURE — 6360000002 HC RX W HCPCS: Performed by: INTERNAL MEDICINE

## 2020-11-09 PROCEDURE — 85025 COMPLETE CBC W/AUTO DIFF WBC: CPT

## 2020-11-09 PROCEDURE — G0008 ADMIN INFLUENZA VIRUS VAC: HCPCS | Performed by: INTERNAL MEDICINE

## 2020-11-09 PROCEDURE — 99231 SBSQ HOSP IP/OBS SF/LOW 25: CPT | Performed by: INTERNAL MEDICINE

## 2020-11-09 PROCEDURE — 90686 IIV4 VACC NO PRSV 0.5 ML IM: CPT | Performed by: INTERNAL MEDICINE

## 2020-11-09 PROCEDURE — 80048 BASIC METABOLIC PNL TOTAL CA: CPT

## 2020-11-09 RX ORDER — ATORVASTATIN CALCIUM 40 MG/1
40 TABLET, FILM COATED ORAL NIGHTLY
Qty: 30 TABLET | Refills: 3 | Status: SHIPPED | OUTPATIENT
Start: 2020-11-09

## 2020-11-09 RX ORDER — BLOOD-GLUCOSE METER
KIT MISCELLANEOUS
Qty: 1 KIT | Refills: 0 | Status: SHIPPED | OUTPATIENT
Start: 2020-11-09

## 2020-11-09 RX ORDER — INSULIN GLARGINE 100 [IU]/ML
50 INJECTION, SOLUTION SUBCUTANEOUS NIGHTLY
Qty: 7 PEN | Refills: 0 | Status: SHIPPED | OUTPATIENT
Start: 2020-11-09

## 2020-11-09 RX ORDER — INSULIN LISPRO 100 [IU]/ML
10 INJECTION, SOLUTION INTRAVENOUS; SUBCUTANEOUS
Qty: 5 PEN | Refills: 0 | Status: SHIPPED | OUTPATIENT
Start: 2020-11-09

## 2020-11-09 RX ORDER — LISINOPRIL 5 MG/1
5 TABLET ORAL DAILY
Qty: 30 TABLET | Refills: 3 | Status: SHIPPED | OUTPATIENT
Start: 2020-11-09

## 2020-11-09 RX ORDER — LANCETS 30 GAUGE
1 EACH MISCELLANEOUS 2 TIMES DAILY
Qty: 100 EACH | Refills: 0 | Status: SHIPPED | OUTPATIENT
Start: 2020-11-09

## 2020-11-09 RX ADMIN — Medication 10 ML: at 10:21

## 2020-11-09 RX ADMIN — INSULIN LISPRO 3 UNITS: 100 INJECTION, SOLUTION INTRAVENOUS; SUBCUTANEOUS at 12:18

## 2020-11-09 RX ADMIN — ASPIRIN 81 MG CHEWABLE TABLET 81 MG: 81 TABLET CHEWABLE at 10:21

## 2020-11-09 RX ADMIN — INSULIN LISPRO 6 UNITS: 100 INJECTION, SOLUTION INTRAVENOUS; SUBCUTANEOUS at 06:23

## 2020-11-09 RX ADMIN — INSULIN LISPRO 10 UNITS: 100 INJECTION, SOLUTION INTRAVENOUS; SUBCUTANEOUS at 06:22

## 2020-11-09 RX ADMIN — SODIUM CHLORIDE: 4.5 INJECTION, SOLUTION INTRAVENOUS at 00:48

## 2020-11-09 RX ADMIN — INSULIN LISPRO 10 UNITS: 100 INJECTION, SOLUTION INTRAVENOUS; SUBCUTANEOUS at 12:16

## 2020-11-09 RX ADMIN — LISINOPRIL 5 MG: 5 TABLET ORAL at 10:21

## 2020-11-09 RX ADMIN — INFLUENZA A VIRUS A/VICTORIA/2454/2019 IVR-207 (H1N1) ANTIGEN (PROPIOLACTONE INACTIVATED), INFLUENZA A VIRUS A/HONG KONG/2671/2019 IVR-208 (H3N2) ANTIGEN (PROPIOLACTONE INACTIVATED), INFLUENZA B VIRUS B/VICTORIA/705/2018 BVR-11 ANTIGEN (PROPIOLACTONE INACTIVATED), INFLUENZA B VIRUS B/PHUKET/3073/2013 BVR-1B ANTIGEN (PROPIOLACTONE INACTIVATED) 0.5 ML: 15; 15; 15; 15 INJECTION, SUSPENSION INTRAMUSCULAR at 14:39

## 2020-11-09 RX ADMIN — PANTOPRAZOLE SODIUM 40 MG: 40 TABLET, DELAYED RELEASE ORAL at 06:27

## 2020-11-09 RX ADMIN — ENOXAPARIN SODIUM 40 MG: 40 INJECTION SUBCUTANEOUS at 10:21

## 2020-11-09 ASSESSMENT — PAIN SCALES - GENERAL: PAINLEVEL_OUTOF10: 0

## 2020-11-09 NOTE — PROGRESS NOTES
Department of Internal Medicine  Nephrology Progress Note    Events reviewed. SUBJECTIVE: We are following Mr. Sana Felipe. for hypernatremia and ANDRAE. Reports no complaints.      PHYSICAL EXAM:      Vitals:    VITALS:  /80   Pulse 72   Temp 97.1 °F (36.2 °C) (Temporal)   Resp 20   Ht 6' (1.829 m)   Wt 275 lb 6.4 oz (124.9 kg)   SpO2 99%   BMI 37.35 kg/m²   24HR INTAKE/OUTPUT:      Intake/Output Summary (Last 24 hours) at 11/9/2020 1321  Last data filed at 11/9/2020 1019  Gross per 24 hour   Intake 2041.28 ml   Output 2525 ml   Net -483.72 ml       Constitutional: Patient is awake alert in no distress  HEENT: Pupils are equal reactive, mucous membranes moist  Respiratory: Lungs are clear  Cardiovascular/Edema: Heart sounds are regular rate and rhythm  Gastrointestinal: Abdomen is soft nontender  Neurologic: Nonfocal  Skin: No lesions no edema  Other: No edema    Scheduled Meds:   atorvastatin  40 mg Oral Nightly    influenza virus vaccine  0.5 mL Intramuscular Prior to discharge    insulin glargine  50 Units Subcutaneous Nightly    insulin lispro  10 Units Subcutaneous TID WC    lisinopril  5 mg Oral Daily    pantoprazole  40 mg Oral QAM AC    insulin lispro  0-18 Units Subcutaneous TID WC    aspirin  81 mg Oral Daily    enoxaparin  40 mg Subcutaneous Daily    sodium chloride flush  10 mL Intravenous 2 times per day     Continuous Infusions:   sodium chloride 50 mL/hr at 11/09/20 0048    dextrose       PRN Meds:.hydrALAZINE, labetalol, glucose, glucagon (rDNA), dextrose, dextrose, dextrose, sodium chloride flush, acetaminophen **OR** acetaminophen, magnesium hydroxide, promethazine **OR** ondansetron    DATA:    CBC:   Lab Results   Component Value Date    WBC 5.5 11/09/2020    RBC 4.06 11/09/2020    HGB 11.7 11/09/2020    HCT 36.2 11/09/2020    MCV 89.2 11/09/2020    MCH 28.8 11/09/2020    MCHC 32.3 11/09/2020    RDW 12.7 11/09/2020     11/09/2020    MPV 10.7 11/09/2020     CMP:    Lab Results   Component Value Date     11/09/2020    K 4.1 11/09/2020     11/09/2020    CO2 25 11/09/2020    BUN 13 11/09/2020    CREATININE 1.3 11/09/2020    GFRAA >60 11/09/2020    LABGLOM >60 11/09/2020    GLUCOSE 209 11/09/2020    PROT 6.2 11/04/2020    LABALBU 3.2 11/04/2020    CALCIUM 8.6 11/09/2020    BILITOT 0.5 11/04/2020    ALKPHOS 80 11/04/2020    AST 23 11/04/2020    ALT 41 11/04/2020     Magnesium:    Lab Results   Component Value Date    MG 2.6 11/05/2020     Phosphorus:    Lab Results   Component Value Date    PHOS 2.7 11/05/2020      Urine albumin/creatinine: 18.8 mg/grams   Urine protein/creatinine: 0.2 g/grams      Radiology Review:      Chest x-ray November 1, 2020   1.  Slightly limited exam, grossly negative for acute process.                     BRIEF SUMMARY OF INITIAL CONSULT:    Briefly Mr. Melany Reno is a 48-year-old man with unremarkable past medical history, was a  and who presented to the ER reporting feeling quite fatigue and short of breath. After evaluation he was found to have a glucose >700 mg/dL, sodium 157 mEq/L, creatinine 1.8 mg/dL, reasons for this consultation. Patient reports that he has been feeling very thirsty, he is urinating all the time and he has had nocturia for the last 2 weeks. Denies any nausea vomiting or diarrhea. Problems resolved:  DKA, ketones in urine, beta hydroxybutyrate >4.5, bicarbonate levels 15 mEq/L, anion gap improved as well as bicarbonate levels. IMPRESSION/RECOMMENDATIONS:        1. ANDRAE stage I, volume responsive prerenal ANDRAE, secondary to urinary losses induced by osmotic diuresis (hyperglycemia). · Creatinine 1.4 mg/dL today with adequate urine output. 2. Hypernatremia, with profound dehydration secondary to urinary losses, osmotic diuresis. · Sodium levels improved with PO free water intake    3.  New onset DM, presented with DKA, glucose levels improved    Plan:    · No longer needs to drink 250 mL of H2O Q1 hour while awake  · Ok for discharge from renal point of view  · Needs to follow up with PCP in [place]     Electronically signed by ARAM Jalloh CNP on 11/9/2020 at 4:56 PM

## 2020-11-09 NOTE — PROGRESS NOTES
[facility]  Internal Medicine Residency / [address]    Attending Physician Statement  I have discussed the case, including pertinent history and exam findings with the resident and the team.  I have seen and examined the patient and the key elements of the encounter have been performed by me. I agree with the assessment, plan and orders as documented by the resident. DKA resolved on insulin coverage   A&O   And occupation   No previous Dx DM 2  Cr 1.3 today  Diabetic education  Will be flying home to [facility]; Continue same       [place] of medical problems as per resident note.       Morena Young MD FRCP([facility])  Internal Medicine Residency Faculty

## 2020-11-09 NOTE — PROGRESS NOTES
[facility]   Department of Pharmacy   Pharmacist Transition of Care Services         Patient Demographics  Name:  Moisés Elmore   Medical Record Number:  [id number]  Gender:  male   Age:  53 y.o. YOB: 1965    Primary Care Physician: No primary care provider on file. Primary Care Physician phone number:  None  Readmission Risk (% from EPIC Patient List): 15%       Pharmacist Review and Summary of Medications     Date of last reviewed/update: 11/9/2020     Category Comments   New Medication Started   1. lipitor  2. lantus  3. lisinopril       Change in Outpatient Medication 1. Discontinued/On hold Outpatient Medication  1. Other          Pharmacist Patient Education:    Date  Person Educated Content of Education                 Documentation of Pharmacist Interventions and Follow-up Plan:     The following Pharmacist Transition of Care Services were completed:   [x]  Reviewed and summarized medication changes  [x]  Entire home medication list was reviewed for accuracy (sources: **)  []  Home medication list was updated or corrected     [x]  Reviewed discharge medication reconciliation  []  Discharge medication list was updated or corrected    []  Added information to AVS   []  Patient education was provided on new medications  []  Patient education was provided on medication changes  []  Reviewed the After Visit Summary (AVS) with patient    Additional Interventions:  []  Inpatient prescriber was contacted and the following pharmacy recommendations        were accepted: **     [] Other interventions: **        Pharmacist: Susy Brooks PharmD, [facility]  Date:  11/9/2020 2:37 PM  Time spent  15 minutes

## 2020-11-10 NOTE — PROGRESS NOTES
CLINICAL PHARMACY NOTE: [address] Select Patient?: No  Total # of Prescriptions Filled: 8   The following medications were delivered to the patient:  · Lisinopril 5 mg  · Atorvastatin calcium 40 mg  · humalog  · lantus  · Insulin syringes  · Freestyle test strips  · Freestyle device  · Freestyle lancets  ·   Total # of Interventions Completed: 3  Time Spent (min): 15    Additional Documentation:

## 2020-11-12 PROBLEM — E11.65 TYPE 2 DIABETES MELLITUS WITH HYPERGLYCEMIA, WITHOUT LONG-TERM CURRENT USE OF INSULIN: Status: ACTIVE | Noted: 2020-11-12

## 2020-11-17 NOTE — DISCHARGE SUMMARY
No acute abnormality of the visualized skull or soft tissues. No acute intracranial abnormality. Xr Chest Portable    Result Date: 11/1/2020  EXAMINATION: ONE XRAY VIEW OF THE CHEST 11/1/2020 12:17 pm COMPARISON: None available to me at the time of this interpretation. HISTORY: ORDERING SYSTEM PROVIDED HISTORY: shortness of breath TECHNOLOGIST PROVIDED HISTORY: Reason for exam:->shortness of breath What reading provider will be dictating this exam?->CRC FINDINGS: Multiple cardiac monitoring leads overlie the patient's chest. Apparent borderline cardiomegaly and top-normal central pulmonary vascular fullness are likely artifactually-accentuated by moderate bilateral pulmonary hypoinflation. Both lungs are otherwise grossly clear. Minimal to mild scattered osseous degenerative disease is most notable at the right acromioclavicular joint, where there is slight chronic fragmentation. No other plain radiographic abnormalities are noted. .     1. Slightly limited exam, grossly negative for acute process. .     Us Retroperitoneal Complete    Result Date: 11/11/2020  EXAMINATION: RETROPERITONEAL ULTRASOUND OF THE KIDNEYS AND URINARY BLADDER 11/3/2020 This exam was not made available for my review until 11/11/2020 15:50 hours. COMPARISON: None available to me at the time of this interpretation. HISTORY: ORDERING SYSTEM PROVIDED HISTORY: newly diagnosed diabetes, elevated creatinine, ?CKD, check kidney morphology TECHNOLOGIST PROVIDED HISTORY: Reason for exam:->newly diagnosed diabetes, elevated creatinine, ?CKD, check kidney morphology What reading provider will be dictating this exam?->CRC FINDINGS: Kidneys: This exam is somewhat limited by patient body habitus and bowel gas. Given these factors: Both kidneys are negative for perinephric fluid collection, gross parenchymal lesion, hydronephrosis, nor shadowing calculus.  The left kidney measures approximately 9.0 cm x 8.1 cm x 5.8 cm, with a maximal cortical thickness of 1.9 cm. The right kidney measures approximately 11.4 cm x 7.7 cm x 6.2 cm, with a maximal cortical thickness of 1.9 cm. Bladder: The urinary bladder is completely collapsed, reportedly by the presence of a Montoya catheter, all poorly demonstrated/not visualized. .     1. Somewhat limited exam, without definite ultrasonographic correlate for the patient's reported elevated creatinine, given technical artifacts. .     Nm Cardiac Stress Test Nuclear Imaging    Result Date: 11/6/2020  Indication: Exertional dyspnea, ventricular arrhythmias Clinical History:   Patient has no known history of coronary artery disease. IMAGING: Myocardial perfusion imaging was performed at rest 30-35 minutes following the intravenous injection of 11.0 mCi of (Tc-Sestamibi) followed by 10 ml of Normal Saline. At peak exercise, the patient was injected intravenously with 30. 9mCi of (Tc-Sestamibi) followed by 10 ml of Normal Saline. Gated post-stress tomographic imaging was performed 20-25 minutes after stress. FINDINGS: The overall quality of the study was good. Left ventricular cavity size was noted to be normal both on the post stress and resting images. Rotational analog analysis demonstrated no evidence of motion artifact or attenuation. The gated SPECT stress imaging in the short, vertical long, and horizontal long axis demonstrated normal homogeneous tracer distribution throughout the myocardium both on the post stress and resting images. Gated SPECT left ventricular ejection fraction was calculated to be 66%, with no regional wall motion abnormalities. The myocardial perfusion imaging was normal. Overall left ventricular systolic function was normal with no regional wall motion abnormalities. Low risk exercise myocardial perfusion imaging study. Pending test results: None    Consults:   1. EP  2. Nephrology   3.  Critical care     Procedures: None    Condition at discharge: Improved, stable    Disposition: home    Discharge Medications:  Discharge Medication List as of 11/9/2020  2:47 PM      START taking these medications    Details   lisinopril (PRINIVIL;ZESTRIL) 5 MG tablet Take 1 tablet by mouth daily, Disp-30 tablet,R-3Normal      atorvastatin (LIPITOR) 40 MG tablet Take 1 tablet by mouth nightly, Disp-30 tablet,R-3Normal      blood glucose test strips (ASCENSIA AUTODISC VI;ONE TOUCH ULTRA TEST VI) strip Disp-100 strip,R-5, NormalCheck blood glucose fasting and one more time randomly everyday      glucose monitoring kit (FREESTYLE) monitoring kit Disp-1 kit,R-0, NormalUse once. insulin glargine (LANTUS SOLOSTAR) 100 UNIT/ML injection pen Inject 50 Units into the skin nightly, Disp-7 pen,R-0Normal      insulin lispro, 1 Unit Dial, (HUMALOG KWIKPEN) 100 UNIT/ML SOPN Inject 10 Units into the skin 3 times daily (before meals), Disp-5 pen,R-0Normal      Lancets MISC 2 TIMES DAILY Starting Mon 11/9/2020, Disp-100 each,R-0, Normal         CONTINUE these medications which have NOT CHANGED    Details   aspirin 81 MG EC tablet Take 81 mg by mouth dailyHistorical Med             Discharge home.      Follow up in [facility] with primary care physician       Rand Penny M.D., PGY - 2   9:43 PM     Attending physician: Dr. Hakeem Stone

## 2021-05-06 ENCOUNTER — PATIENT MESSAGE (OUTPATIENT)
Dept: RESEARCH | Facility: HOSPITAL | Age: 56
End: 2021-05-06

## 2021-11-15 ENCOUNTER — PATIENT OUTREACH (OUTPATIENT)
Dept: ADMINISTRATIVE | Facility: HOSPITAL | Age: 56
End: 2021-11-15
Payer: MEDICAID

## 2022-02-16 ENCOUNTER — PATIENT MESSAGE (OUTPATIENT)
Dept: ADMINISTRATIVE | Facility: HOSPITAL | Age: 57
End: 2022-02-16
Payer: MEDICAID

## 2022-02-17 ENCOUNTER — PATIENT OUTREACH (OUTPATIENT)
Dept: ADMINISTRATIVE | Facility: HOSPITAL | Age: 57
End: 2022-02-17
Payer: MEDICAID

## 2022-02-17 DIAGNOSIS — E11.65 TYPE 2 DIABETES MELLITUS WITH HYPERGLYCEMIA, WITHOUT LONG-TERM CURRENT USE OF INSULIN: Primary | ICD-10-CM

## 2022-03-04 DIAGNOSIS — Z12.11 COLON CANCER SCREENING: ICD-10-CM

## 2022-04-04 ENCOUNTER — PATIENT MESSAGE (OUTPATIENT)
Dept: ADMINISTRATIVE | Facility: HOSPITAL | Age: 57
End: 2022-04-04
Payer: MEDICAID

## 2022-07-11 ENCOUNTER — PATIENT MESSAGE (OUTPATIENT)
Dept: ADMINISTRATIVE | Facility: HOSPITAL | Age: 57
End: 2022-07-11
Payer: MEDICAID

## 2022-08-10 ENCOUNTER — PATIENT OUTREACH (OUTPATIENT)
Dept: ADMINISTRATIVE | Facility: HOSPITAL | Age: 57
End: 2022-08-10
Payer: MEDICAID

## 2022-08-10 NOTE — LETTER
AUTHORIZATION FOR RELEASE OF   CONFIDENTIAL INFORMATION    Dear [facility],    We are seeing Nithin Barba, date of birth 1965, in the clinic at [facility]. Eber River II, NP is the patient's PCP. Nithin Barba has an outstanding lab/procedure at the time we reviewed his chart. In order to help keep his health information updated, he has authorized us to request the following medical record(s):                                       (X)  COLONOSCOPY      Please fax records to [facility], Everette Talbot II, NP,  at [phone]       If you have any questions, please contact Ginny NOBLE at [phone].           Patient Name: Nithin Barba  : 1965  Patient Phone #: [phone]

## 2022-08-10 NOTE — PROGRESS NOTES
Contacted pt in reference to overdue DM eye exam and missed pcp appt. Pt agreed to an eye exam but wants to wait and call back to schedule pcp appt.   [facility] for outside colonoscopy

## 2022-08-10 NOTE — LETTER
AUTHORIZATION FOR RELEASE OF   CONFIDENTIAL INFORMATION    Dear Yordan Mireles MD,    We are seeing Nithin Barba, date of birth 1965, in the clinic at [facility]. Eber River II, NP is the patient's PCP. Nithin Barba has an outstanding lab/procedure at the time we reviewed his chart. In order to help keep his health information updated, he has authorized us to request the following medical record(s):                                          (X  )  COLONOSCOPY       Please fax records to [facility], Everette Talbot II, NP,  at [phone]         If you have any questions, please contact Ginny NOBLE at [phone]        Patient Name: Nithin Barba  : 1965  Patient Phone #: [phone]

## 2022-08-11 ENCOUNTER — PATIENT OUTREACH (OUTPATIENT)
Dept: ADMINISTRATIVE | Facility: HOSPITAL | Age: 57
End: 2022-08-11
Payer: MEDICAID

## 2022-08-17 DIAGNOSIS — E11.9 TYPE 2 DIABETES MELLITUS WITHOUT COMPLICATION: ICD-10-CM

## 2022-10-03 ENCOUNTER — PATIENT MESSAGE (OUTPATIENT)
Dept: ADMINISTRATIVE | Facility: HOSPITAL | Age: 57
End: 2022-10-03
Payer: MEDICAID

## 2022-11-25 NOTE — PROGRESS NOTES
[facility]  Internal Medicine Residency Program  Progress Note - House Team 1    Patient:  Cait Barajas 53 y.o. male MRN: [id number]     Date of Service: 11/4/2020     CC: Neurolyse fatigue, shortness of breath first  Overnight events: None    Subjective     Patient was seen and examined this morning. He has no new complaints. He admits to increase p.o. intake of free water.  -Patient disabling however developed 15 beats of nonsustained V. tach. He remained stable with stable vital signs. Electrolytes within normal limits except for magnesium level of 2.7  Objective     Physical Exam:  · Vitals: BP (!) 141/94   Pulse 77   Temp 98.2 °F (36.8 °C) (Oral)   Resp 16   Ht 6' (1.829 m)   Wt 272 lb 3 oz (123.5 kg)   SpO2 98%   BMI 36.92 kg/m²     · I & O - 24hr: No intake/output data recorded. · General Appearance: alert, appears stated age and cooperative  · HEENT:  Head: Normocephalic, no lesions, without obvious abnormality. · Neck: no adenopathy, no carotid bruit, no JVD, supple, symmetrical, trachea midline and thyroid not enlarged, symmetric, no tenderness/mass/nodules  · Lung: clear to auscultation bilaterally  · Heart: regular rate and rhythm, S1, S2 normal, no murmur, click, rub or gallop  · Abdomen: soft, non-tender; bowel sounds normal; no masses,  no organomegaly  · Extremities:  extremities normal, atraumatic, no cyanosis or edema  · Musculokeletal: No joint swelling, no muscle tenderness. ROM normal in all joints of extremities.    · Neurologic: Mental status: Alert, oriented, thought content appropriate  Subject  Pertinent Labs & Imaging Studies   amy  CBC:   Lab Results   Component Value Date    WBC 7.1 11/04/2020    RBC 4.85 11/04/2020    HGB 14.2 11/04/2020    HCT 44.0 11/04/2020    MCV 90.7 11/04/2020    MCH 29.3 11/04/2020    MCHC 32.3 11/04/2020    RDW 14.1 11/04/2020     11/04/2020    MPV 10.8 11/04/2020     BMP:    Lab Results   Component Value Date     Pt states he has had a cough for a month and had a fever of 105 around 0200 this morning and he took motrin at that time. Pt took tylenol around 0600.

## 2023-01-23 ENCOUNTER — PATIENT OUTREACH (OUTPATIENT)
Dept: ADMINISTRATIVE | Facility: HOSPITAL | Age: 58
End: 2023-01-23
Payer: MEDICAID

## 2023-01-23 DIAGNOSIS — E11.9 TYPE 2 DIABETES MELLITUS WITHOUT COMPLICATION, WITHOUT LONG-TERM CURRENT USE OF INSULIN: Primary | ICD-10-CM

## 2023-01-23 NOTE — PROGRESS NOTES
Pt returned call in reference to overdue labs, eye and pcp appt. Pt agreed to scheduled labs and eye exam but wants to call back to schedule pcp. Stress the importance of seeing pcp visit. Pt states he will call back once he check his schedule to make pcp appt.

## 2023-04-10 ENCOUNTER — PATIENT OUTREACH (OUTPATIENT)
Dept: ADMINISTRATIVE | Facility: HOSPITAL | Age: 58
End: 2023-04-10
Payer: MEDICAID

## 2023-04-20 ENCOUNTER — PATIENT OUTREACH (OUTPATIENT)
Dept: ADMINISTRATIVE | Facility: HOSPITAL | Age: 58
End: 2023-04-20
Payer: MEDICAID

## 2023-05-10 ENCOUNTER — PATIENT OUTREACH (OUTPATIENT)
Dept: ADMINISTRATIVE | Facility: HOSPITAL | Age: 58
End: 2023-05-10
Payer: MEDICAID

## 2023-05-10 DIAGNOSIS — E11.9 TYPE 2 DIABETES MELLITUS WITHOUT COMPLICATION: ICD-10-CM
